# Patient Record
Sex: MALE | Race: BLACK OR AFRICAN AMERICAN | URBAN - METROPOLITAN AREA
[De-identification: names, ages, dates, MRNs, and addresses within clinical notes are randomized per-mention and may not be internally consistent; named-entity substitution may affect disease eponyms.]

---

## 2023-06-08 LAB
ALANINE AMINOTRANSFERASE (SGPT) (U/L) IN SER/PLAS: 11 U/L (ref 10–52)
ALBUMIN (G/DL) IN SER/PLAS: 4.3 G/DL (ref 3.4–5)
ALKALINE PHOSPHATASE (U/L) IN SER/PLAS: 38 U/L (ref 33–136)
ANION GAP IN SER/PLAS: 11 MMOL/L (ref 10–20)
ASPARTATE AMINOTRANSFERASE (SGOT) (U/L) IN SER/PLAS: 20 U/L (ref 9–39)
BASOPHILS (10*3/UL) IN BLOOD BY AUTOMATED COUNT: 0.02 X10E9/L (ref 0–0.1)
BASOPHILS/100 LEUKOCYTES IN BLOOD BY AUTOMATED COUNT: 0.5 % (ref 0–2)
BILIRUBIN TOTAL (MG/DL) IN SER/PLAS: 0.4 MG/DL (ref 0–1.2)
CALCIUM (MG/DL) IN SER/PLAS: 10 MG/DL (ref 8.6–10.6)
CARBON DIOXIDE, TOTAL (MMOL/L) IN SER/PLAS: 27 MMOL/L (ref 21–32)
CD3+CD4+ ABSOLUTE: 0.27 X10E9/L (ref 0.35–2.74)
CD3+CD8+ ABSOLUTE: 0.83 X10E9/L (ref 0.08–1.49)
CD4/CD8 RATIO: 0.32 (ref 1–3.5)
CD45%: 100 %
CHLORIDE (MMOL/L) IN SER/PLAS: 107 MMOL/L (ref 98–107)
CP CD3+CD4+%: 21 % (ref 29–57)
CP CD3+CD8+%: 65 % (ref 7–31)
CREATININE (MG/DL) IN SER/PLAS: 2.21 MG/DL (ref 0.5–1.3)
EOSINOPHILS (10*3/UL) IN BLOOD BY AUTOMATED COUNT: 0.13 X10E9/L (ref 0–0.4)
EOSINOPHILS/100 LEUKOCYTES IN BLOOD BY AUTOMATED COUNT: 3.3 % (ref 0–6)
ERYTHROCYTE DISTRIBUTION WIDTH (RATIO) BY AUTOMATED COUNT: 14.6 % (ref 11.5–14.5)
ERYTHROCYTE MEAN CORPUSCULAR HEMOGLOBIN CONCENTRATION (G/DL) BY AUTOMATED: 32.2 G/DL (ref 32–36)
ERYTHROCYTE MEAN CORPUSCULAR VOLUME (FL) BY AUTOMATED COUNT: 98 FL (ref 80–100)
ERYTHROCYTES (10*6/UL) IN BLOOD BY AUTOMATED COUNT: 3.95 X10E12/L (ref 4.5–5.9)
FMETH: ABNORMAL
FSIT1: ABNORMAL
GFR MALE: 31 ML/MIN/1.73M2
GLUCOSE (MG/DL) IN SER/PLAS: 75 MG/DL (ref 74–99)
HEMATOCRIT (%) IN BLOOD BY AUTOMATED COUNT: 38.8 % (ref 41–52)
HEMOGLOBIN (G/DL) IN BLOOD: 12.5 G/DL (ref 13.5–17.5)
IMMATURE GRANULOCYTES/100 LEUKOCYTES IN BLOOD BY AUTOMATED COUNT: 0 % (ref 0–0.9)
LEUKOCYTES (10*3/UL) IN BLOOD BY AUTOMATED COUNT: 4 X10E9/L (ref 4.4–11.3)
LYMPHOCYTES (10*3/UL) IN BLOOD BY AUTOMATED COUNT: 1.27 X10E9/L (ref 0.8–3)
LYMPHOCYTES/100 LEUKOCYTES IN BLOOD BY AUTOMATED COUNT: 31.8 % (ref 13–44)
MONOCYTES (10*3/UL) IN BLOOD BY AUTOMATED COUNT: 0.36 X10E9/L (ref 0.05–0.8)
MONOCYTES/100 LEUKOCYTES IN BLOOD BY AUTOMATED COUNT: 9 % (ref 2–10)
NEUTROPHILS (10*3/UL) IN BLOOD BY AUTOMATED COUNT: 2.21 X10E9/L (ref 1.6–5.5)
NEUTROPHILS/100 LEUKOCYTES IN BLOOD BY AUTOMATED COUNT: 55.4 % (ref 40–80)
NRBC (PER 100 WBCS) BY AUTOMATED COUNT: 0 /100 WBC (ref 0–0)
PLATELETS (10*3/UL) IN BLOOD AUTOMATED COUNT: 195 X10E9/L (ref 150–450)
POTASSIUM (MMOL/L) IN SER/PLAS: 5.2 MMOL/L (ref 3.5–5.3)
PROTEIN TOTAL: 7.2 G/DL (ref 6.4–8.2)
SODIUM (MMOL/L) IN SER/PLAS: 140 MMOL/L (ref 136–145)
UREA NITROGEN (MG/DL) IN SER/PLAS: 24 MG/DL (ref 6–23)

## 2023-06-09 LAB
HIV-1 RNA PCR VIRAL LOAD LOG: NORMAL LOG10 CPY/ML
HIV-1 RNA VIRAL LOAD: NOT DETECTED COPIES/ML

## 2023-09-29 ENCOUNTER — LAB (OUTPATIENT)
Dept: LAB | Facility: LAB | Age: 73
End: 2023-09-29
Payer: MEDICARE

## 2023-09-29 LAB
ALBUMIN (G/DL) IN SER/PLAS: 4.5 G/DL (ref 3.4–5)
ANION GAP IN SER/PLAS: 15 MMOL/L (ref 10–20)
CALCIUM (MG/DL) IN SER/PLAS: 10.1 MG/DL (ref 8.6–10.6)
CARBON DIOXIDE, TOTAL (MMOL/L) IN SER/PLAS: 27 MMOL/L (ref 21–32)
CHLORIDE (MMOL/L) IN SER/PLAS: 107 MMOL/L (ref 98–107)
CHOLESTEROL (MG/DL) IN SER/PLAS: 170 MG/DL (ref 0–199)
CHOLESTEROL IN HDL (MG/DL) IN SER/PLAS: 88.2 MG/DL
CHOLESTEROL/HDL RATIO: 1.9
CREATININE (MG/DL) IN SER/PLAS: 2.09 MG/DL (ref 0.5–1.3)
ERYTHROCYTE DISTRIBUTION WIDTH (RATIO) BY AUTOMATED COUNT: 14.5 % (ref 11.5–14.5)
ERYTHROCYTE MEAN CORPUSCULAR HEMOGLOBIN CONCENTRATION (G/DL) BY AUTOMATED: 32.1 G/DL (ref 32–36)
ERYTHROCYTE MEAN CORPUSCULAR VOLUME (FL) BY AUTOMATED COUNT: 97 FL (ref 80–100)
ERYTHROCYTES (10*6/UL) IN BLOOD BY AUTOMATED COUNT: 4.33 X10E12/L (ref 4.5–5.9)
FERRITIN (UG/LL) IN SER/PLAS: 102 UG/L (ref 20–300)
GFR MALE: 33 ML/MIN/1.73M2
GLUCOSE (MG/DL) IN SER/PLAS: 76 MG/DL (ref 74–99)
HEMATOCRIT (%) IN BLOOD BY AUTOMATED COUNT: 42.1 % (ref 41–52)
HEMOGLOBIN (G/DL) IN BLOOD: 13.5 G/DL (ref 13.5–17.5)
IRON (UG/DL) IN SER/PLAS: 49 UG/DL (ref 35–150)
IRON BINDING CAPACITY (UG/DL) IN SER/PLAS: 340 UG/DL (ref 240–445)
IRON SATURATION (%) IN SER/PLAS: 14 % (ref 25–45)
LDL: 69 MG/DL (ref 0–99)
LEUKOCYTES (10*3/UL) IN BLOOD BY AUTOMATED COUNT: 4 X10E9/L (ref 4.4–11.3)
NATRIURETIC PEPTIDE B (PG/ML) IN SER/PLAS: 108 PG/ML (ref 0–99)
NRBC (PER 100 WBCS) BY AUTOMATED COUNT: 0 /100 WBC (ref 0–0)
PHOSPHATE (MG/DL) IN SER/PLAS: 4.1 MG/DL (ref 2.5–4.9)
PLATELETS (10*3/UL) IN BLOOD AUTOMATED COUNT: 197 X10E9/L (ref 150–450)
POTASSIUM (MMOL/L) IN SER/PLAS: 5.5 MMOL/L (ref 3.5–5.3)
SODIUM (MMOL/L) IN SER/PLAS: 143 MMOL/L (ref 136–145)
TRIGLYCERIDE (MG/DL) IN SER/PLAS: 65 MG/DL (ref 0–149)
UREA NITROGEN (MG/DL) IN SER/PLAS: 20 MG/DL (ref 6–23)
VLDL: 13 MG/DL (ref 0–40)

## 2023-11-10 DIAGNOSIS — E78.5 HYPERLIPIDEMIA, UNSPECIFIED HYPERLIPIDEMIA TYPE: Primary | ICD-10-CM

## 2023-11-10 RX ORDER — ATORVASTATIN CALCIUM 80 MG/1
80 TABLET, FILM COATED ORAL DAILY
Qty: 90 TABLET | Refills: 3 | Status: SHIPPED | OUTPATIENT
Start: 2023-11-10

## 2023-11-27 DIAGNOSIS — I25.5 ISCHEMIC CARDIOMYOPATHY: Primary | ICD-10-CM

## 2023-11-27 RX ORDER — DAPAGLIFLOZIN 10 MG/1
10 TABLET, FILM COATED ORAL DAILY
COMMUNITY
Start: 2023-10-21 | End: 2023-11-27 | Stop reason: SDUPTHER

## 2023-11-27 RX ORDER — DAPAGLIFLOZIN 10 MG/1
10 TABLET, FILM COATED ORAL DAILY
Qty: 30 TABLET | Refills: 11 | Status: SHIPPED | OUTPATIENT
Start: 2023-11-27 | End: 2024-02-23 | Stop reason: ALTCHOICE

## 2023-12-07 ENCOUNTER — OFFICE VISIT (OUTPATIENT)
Dept: IMMUNOLOGY | Facility: CLINIC | Age: 73
End: 2023-12-07
Payer: MEDICARE

## 2023-12-07 VITALS
HEIGHT: 69 IN | TEMPERATURE: 97.9 F | BODY MASS INDEX: 20.67 KG/M2 | OXYGEN SATURATION: 100 % | DIASTOLIC BLOOD PRESSURE: 91 MMHG | HEART RATE: 56 BPM | SYSTOLIC BLOOD PRESSURE: 173 MMHG

## 2023-12-07 DIAGNOSIS — B20 AIDS (MULTI): Primary | ICD-10-CM

## 2023-12-07 DIAGNOSIS — Z79.899 HIGH RISK MEDICATION USE: ICD-10-CM

## 2023-12-07 DIAGNOSIS — L30.9 ECZEMA, UNSPECIFIED TYPE: ICD-10-CM

## 2023-12-07 DIAGNOSIS — I10 PRIMARY HYPERTENSION: ICD-10-CM

## 2023-12-07 PROBLEM — E78.5 HYPERLIPIDEMIA: Status: ACTIVE | Noted: 2023-12-07

## 2023-12-07 PROBLEM — I25.10 ARTERIOSCLEROSIS OF CORONARY ARTERY: Status: ACTIVE | Noted: 2023-12-07

## 2023-12-07 PROBLEM — I25.5 ISCHEMIC DILATED CARDIOMYOPATHY (MULTI): Status: ACTIVE | Noted: 2023-12-07

## 2023-12-07 PROBLEM — I42.0 ISCHEMIC DILATED CARDIOMYOPATHY (MULTI): Status: ACTIVE | Noted: 2023-12-07

## 2023-12-07 PROBLEM — D12.6 TUBULAR ADENOMA OF COLON: Status: ACTIVE | Noted: 2023-12-07

## 2023-12-07 PROBLEM — E66.9 OBESITY: Status: ACTIVE | Noted: 2023-12-07

## 2023-12-07 LAB
BASOPHILS # BLD AUTO: 0.02 X10*3/UL (ref 0–0.1)
BASOPHILS NFR BLD AUTO: 0.5 %
EOSINOPHIL # BLD AUTO: 0.14 X10*3/UL (ref 0–0.4)
EOSINOPHIL NFR BLD AUTO: 3.6 %
ERYTHROCYTE [DISTWIDTH] IN BLOOD BY AUTOMATED COUNT: 14.6 % (ref 11.5–14.5)
HCT VFR BLD AUTO: 41.6 % (ref 41–52)
HGB BLD-MCNC: 13.4 G/DL (ref 13.5–17.5)
IMM GRANULOCYTES # BLD AUTO: 0.01 X10*3/UL (ref 0–0.5)
IMM GRANULOCYTES NFR BLD AUTO: 0.3 % (ref 0–0.9)
LYMPHOCYTES # BLD AUTO: 1.17 X10*3/UL (ref 0.8–3)
LYMPHOCYTES NFR BLD AUTO: 29.7 %
MCH RBC QN AUTO: 31.5 PG (ref 26–34)
MCHC RBC AUTO-ENTMCNC: 32.2 G/DL (ref 32–36)
MCV RBC AUTO: 98 FL (ref 80–100)
MONOCYTES # BLD AUTO: 0.31 X10*3/UL (ref 0.05–0.8)
MONOCYTES NFR BLD AUTO: 7.9 %
NEUTROPHILS # BLD AUTO: 2.29 X10*3/UL (ref 1.6–5.5)
NEUTROPHILS NFR BLD AUTO: 58 %
NRBC BLD-RTO: 0 /100 WBCS (ref 0–0)
PLATELET # BLD AUTO: 191 X10*3/UL (ref 150–450)
RBC # BLD AUTO: 4.26 X10*6/UL (ref 4.5–5.9)
WBC # BLD AUTO: 3.9 X10*3/UL (ref 4.4–11.3)

## 2023-12-07 PROCEDURE — 82248 BILIRUBIN DIRECT: CPT | Performed by: INTERNAL MEDICINE

## 2023-12-07 PROCEDURE — 1126F AMNT PAIN NOTED NONE PRSNT: CPT | Performed by: INTERNAL MEDICINE

## 2023-12-07 PROCEDURE — 85025 COMPLETE CBC W/AUTO DIFF WBC: CPT | Performed by: INTERNAL MEDICINE

## 2023-12-07 PROCEDURE — 3077F SYST BP >= 140 MM HG: CPT | Performed by: INTERNAL MEDICINE

## 2023-12-07 PROCEDURE — 84100 ASSAY OF PHOSPHORUS: CPT | Performed by: INTERNAL MEDICINE

## 2023-12-07 PROCEDURE — 3080F DIAST BP >= 90 MM HG: CPT | Performed by: INTERNAL MEDICINE

## 2023-12-07 PROCEDURE — 1159F MED LIST DOCD IN RCRD: CPT | Performed by: INTERNAL MEDICINE

## 2023-12-07 PROCEDURE — 87536 HIV-1 QUANT&REVRSE TRNSCRPJ: CPT | Performed by: INTERNAL MEDICINE

## 2023-12-07 PROCEDURE — 1036F TOBACCO NON-USER: CPT | Performed by: INTERNAL MEDICINE

## 2023-12-07 PROCEDURE — 88185 FLOWCYTOMETRY/TC ADD-ON: CPT | Mod: TC | Performed by: INTERNAL MEDICINE

## 2023-12-07 PROCEDURE — 4274F FLU IMMUNO ADMIND RCVD: CPT | Performed by: INTERNAL MEDICINE

## 2023-12-07 PROCEDURE — 99214 OFFICE O/P EST MOD 30 MIN: CPT | Mod: 25 | Performed by: INTERNAL MEDICINE

## 2023-12-07 PROCEDURE — 1160F RVW MEDS BY RX/DR IN RCRD: CPT | Performed by: INTERNAL MEDICINE

## 2023-12-07 PROCEDURE — 99214 OFFICE O/P EST MOD 30 MIN: CPT | Performed by: INTERNAL MEDICINE

## 2023-12-07 PROCEDURE — 80053 COMPREHEN METABOLIC PANEL: CPT | Performed by: INTERNAL MEDICINE

## 2023-12-07 PROCEDURE — 36415 COLL VENOUS BLD VENIPUNCTURE: CPT | Performed by: INTERNAL MEDICINE

## 2023-12-07 RX ORDER — EFAVIRENZ, LAMIVUDINE AND TENOFOVIR DISOPROXIL FUMARATE 600; 300; 300 MG/1; MG/1; MG/1
1 TABLET, FILM COATED ORAL DAILY
Qty: 30 TABLET | Refills: 11 | Status: CANCELLED | OUTPATIENT
Start: 2023-12-07 | End: 2024-12-06

## 2023-12-07 RX ORDER — SACUBITRIL AND VALSARTAN 97; 103 MG/1; MG/1
1 TABLET, FILM COATED ORAL 2 TIMES DAILY
COMMUNITY
Start: 2023-01-09 | End: 2024-02-23

## 2023-12-07 RX ORDER — ABACAVIR AND LAMIVUDINE 600; 300 MG/1; MG/1
1 TABLET, FILM COATED ORAL DAILY
COMMUNITY
Start: 2012-04-09 | End: 2024-03-28

## 2023-12-07 RX ORDER — PNV NO.95/FERROUS FUM/FOLIC AC 28MG-0.8MG
1 TABLET ORAL DAILY
COMMUNITY
Start: 2018-11-19

## 2023-12-07 RX ORDER — DOLUTEGRAVIR SODIUM 50 MG/1
1 TABLET, FILM COATED ORAL DAILY
COMMUNITY
Start: 2017-04-24 | End: 2024-02-28 | Stop reason: SDUPTHER

## 2023-12-07 RX ORDER — TRAVOPROST OPHTHALMIC SOLUTION 0.04 MG/ML
1 SOLUTION OPHTHALMIC NIGHTLY
COMMUNITY

## 2023-12-07 RX ORDER — FOLIC ACID/MULTIVIT,IRON,MINER 0.4MG-18MG
2 TABLET ORAL DAILY
COMMUNITY
Start: 2022-09-02

## 2023-12-07 RX ORDER — SPIRONOLACTONE 25 MG/1
12.5 TABLET ORAL DAILY
COMMUNITY

## 2023-12-07 RX ORDER — EFAVIRENZ, LAMIVUDINE AND TENOFOVIR DISOPROXIL FUMARATE 600; 300; 300 MG/1; MG/1; MG/1
1 TABLET, FILM COATED ORAL DAILY
Qty: 30 TABLET | Refills: 11 | Status: SHIPPED | OUTPATIENT
Start: 2023-12-07 | End: 2023-12-07 | Stop reason: ALTCHOICE

## 2023-12-07 RX ORDER — ASPIRIN 325 MG
1 TABLET ORAL DAILY
COMMUNITY
Start: 2022-06-03

## 2023-12-07 RX ORDER — ASPIRIN 81 MG/1
1 TABLET ORAL DAILY
COMMUNITY
Start: 2015-10-15

## 2023-12-07 RX ORDER — BRIMONIDINE TARTRATE 2 MG/ML
1 SOLUTION/ DROPS OPHTHALMIC 2 TIMES DAILY
COMMUNITY
Start: 2023-10-10

## 2023-12-07 RX ORDER — HYDROCORTISONE ACETATE 0.5 %
2 CREAM (GRAM) TOPICAL
COMMUNITY
Start: 2022-06-03

## 2023-12-07 RX ORDER — CARVEDILOL 12.5 MG/1
12.5 TABLET ORAL 2 TIMES DAILY
COMMUNITY

## 2023-12-07 RX ORDER — BEMPEDOIC ACID AND EZETIMIBE 180; 10 MG/1; MG/1
1 TABLET, FILM COATED ORAL DAILY
COMMUNITY
Start: 2020-08-28 | End: 2024-03-28 | Stop reason: SDUPTHER

## 2023-12-07 NOTE — ASSESSMENT & PLAN NOTE
Persistent HTN after repeat testing with patient seated quietly for 10 minutes.  Endorses that morning BP 4 hours after medications is in the 80-90/70s.  Encouraged him to continue log and to take that to his next appointment with Dr. Garcia. They can check BP using both machines to verify that they are measuring equally.

## 2023-12-07 NOTE — PATIENT INSTRUCTIONS
It was great to see you today!  Your last blood work that we did in clinic showed that your T-cells (CD4 count) was 267 / 21 %.  Your virus was fully suppressed at less than 20 copies.  Keep up the great work taking your medications.  We collected routine blood work today.  I will see you back in clinic in 6 months.  If any issues should arise before then, please remember to call the clinic and get in contact with your nurse.    Don't forget to take your BP log and your BP machine with you to your appointment with Dr. Garcia when you go to see her so that she can make sure that your cuff is measuring accurately.    Please get the RSV vaccine when you are able.    Wishing you and your loved ones a very Happy Holiday Season!!  Be safe and see you next year!!

## 2023-12-07 NOTE — ASSESSMENT & PLAN NOTE
Remains immunologically intact and virologically suppressed.  Routine labs will be collected today: CBC w/ diff, Hepatic panel, renal panel, CD4 and HIV RNA PCR.   We will continue current therapy with Dolutegravir and Epzicom.    He will follow-up in 6months

## 2023-12-07 NOTE — PROGRESS NOTES
"HIV Clinic Follow-up Visit:    Ephraim Michel was last seen in [facility] on Visit date not found    Missed antiretroviral doses in last 72 hours? No    Sexually active? no, Partner/s aware of diagnosis? N/A,     Condom use?  NA , Partner on PrEP? NA    Tobacco use: No     SUBJECTIVE:   Here for follow up.  No ER or UC visits.  States that this AM (4 hrs after BP medications), his BP was 89/61 without symptoms of low perfusion.  Indicates that he is high at all medical visits and that Dr. Garcia is concerned.  He is taking machine in with his log when he sees her next so that readings can be compared and device calibrated if needed.  He denies HA, CP, ALVARENGA, PND, orthopnea.    He does not exercise at all.  Able to perform all ADLs without issue.  Walks to [place] and [place] without issues and carries groceries.  Climbs stairs to [place] without SOB, ALVARENGA, CP, or dizziness  Weight is stable at home in 140s.  Takes Metamucil TID for BMs and \"to keep weight under control\".  No straining with BMs.  Just received his Flu and Covid-19 booster on 12/1.  Asking about RSV vaccine.    Review of Systems  Full 10 point ROS performed.  All pertinent positives and negatives as documented in HPI.      CURRENT MEDICATIONS:    Current Outpatient Medications:     abacavir-lamiVUDine (Epzicom) 600-300 mg tablet, Take 1 tablet by mouth once daily., Disp: , Rfl:     aspirin 81 mg EC tablet, Take 1 tablet (81 mg) by mouth once daily., Disp: , Rfl:     atorvastatin (Lipitor) 80 mg tablet, TAKE 1 TABLET BY MOUTH EVERY DAY, Disp: 90 tablet, Rfl: 3    brimonidine (AlphaGAN) 0.2 % ophthalmic solution, Administer 1 drop into both eyes 2 times a day., Disp: , Rfl:     carvedilol (Coreg) 12.5 mg tablet, Take 1 tablet (12.5 mg) by mouth 2 times a day., Disp: , Rfl:     cholecalciferol (Vitamin D-3) 10 mcg (400 unit) tablet,chewable, Chew 2 tablets (800 Units) once daily., Disp: , Rfl:     cyanocobalamin (Vitamin B-12) 100 mcg tablet, Take 1 tablet (100 mcg) " "by mouth once daily., Disp: , Rfl:     Entresto  mg tablet, Take 1 tablet by mouth 2 times a day., Disp: , Rfl:     Farxiga 10 mg, Take 1 tablet (10 mg) by mouth once daily., Disp: 30 tablet, Rfl: 11    glucosam garber dip-chondroit-C-Mn 951-250-14-3 mg capsule, Take 2 capsules by mouth once daily., Disp: , Rfl:     multivitamin (One Daily Multivitamin) tablet, Take 1 tablet by mouth once daily., Disp: , Rfl:     Nexlizet 180-10 mg tablet, Take 1 tablet by mouth once daily., Disp: , Rfl:     spironolactone (Aldactone) 25 mg tablet, Take 0.5 tablets (12.5 mg) by mouth once daily., Disp: , Rfl:     Tivicay 50 mg tablet, Take 1 tablet (50 mg) by mouth once daily., Disp: , Rfl:     travoprost (Travatan Z) 0.004 % drops ophthalmic solution, Administer 1 drop into both eyes once daily at bedtime., Disp: , Rfl:     ferric carboxymaltose (Injectafer) 50 mg iron/mL injection, Administer intravenously once a week for 2 weeks (Patient not taking: Reported on 12/7/2023), Disp: 30 mL, Rfl: 0    PHYSICAL EXAMINATION:  Visit Vitals  BP (!) 173/91 (BP Location: Left arm, Patient Position: Sitting, BP Cuff Size: Adult)   Pulse 56   Temp 36.6 °C (97.9 °F)   Ht 1.753 m (5' 9\")   SpO2 100%   BMI 20.67 kg/m²   Smoking Status Former   BSA 1.76 m²       Physical Exam   CONSTITUTIONAL: Cachectic appearing , NAD, cooperative  SKIN:  No rashes or lesions.  EYES: PERRLA, EOMI, Sclera anicteric.  No conjunctival injection.  No petechial  or embolic lesions  ENMT: MMM, No oral lesions or thrush. Dentition in good repair.  No pharyngeal erythema  CVS: Old median sternotomy incision (healed) RRR, S1 & S2 normal.  No murmurs, rubs or gallops.    RESPIRATORY/CHEST: Clear in all lung fields.  No rales or rhonchi  GI: Soft, NT/ND.  No palpable hepatosplenomegaly.  No rebound or guarding  EXT: No CCE.  Neuro: No facial asymmetry. DTRs symmetric. Gait normal. No functional weakness      PERTINENT DATA:  CBC:  WBC   Date Value Ref Range Status "   09/29/2023 4.0 (L) 4.4 - 11.3 x10E9/L Final     nRBC   Date Value Ref Range Status   09/29/2023 0.0 0.0 - 0.0 /100 WBC Final     RBC   Date Value Ref Range Status   09/29/2023 4.33 (L) 4.50 - 5.90 x10E12/L Final     Hemoglobin   Date Value Ref Range Status   09/29/2023 13.5 13.5 - 17.5 g/dL Final     MCV   Date Value Ref Range Status   09/29/2023 97 80 - 100 fL Final     RDW   Date Value Ref Range Status   09/29/2023 14.5 11.5 - 14.5 % Final       Renal Function Panel:  Glucose   Date Value Ref Range Status   09/29/2023 76 74 - 99 mg/dL Final     Sodium   Date Value Ref Range Status   09/29/2023 143 136 - 145 mmol/L Final     Potassium   Date Value Ref Range Status   09/29/2023 5.5 (H) 3.5 - 5.3 mmol/L Final     Chloride   Date Value Ref Range Status   09/29/2023 107 98 - 107 mmol/L Final     Anion Gap   Date Value Ref Range Status   09/29/2023 15 10 - 20 mmol/L Final     Urea Nitrogen   Date Value Ref Range Status   09/29/2023 20 6 - 23 mg/dL Final     Creatinine   Date Value Ref Range Status   09/29/2023 2.09 (H) 0.50 - 1.30 mg/dL Final     Calcium   Date Value Ref Range Status   09/29/2023 10.1 8.6 - 10.6 mg/dL Final     Phosphorus   Date Value Ref Range Status   09/29/2023 4.1 2.5 - 4.9 mg/dL Final     Comment:      The performance characteristics of phosphorus testing in   heparinized plasma have been validated by the individual     laboratory site where testing is performed. Testing    on heparinized plasma is not approved by the FDA;    however, such approval is not necessary.       Albumin   Date Value Ref Range Status   09/29/2023 4.5 3.4 - 5.0 g/dL Final       Hepatic Panel:  Albumin   Date Value Ref Range Status   09/29/2023 4.5 3.4 - 5.0 g/dL Final     Total Bilirubin   Date Value Ref Range Status   06/08/2023 0.4 0.0 - 1.2 mg/dL Final     Bilirubin, Direct   Date Value Ref Range Status   12/08/2022 0.1 0.0 - 0.3 mg/dL Final     Alkaline Phosphatase   Date Value Ref Range Status   06/08/2023 38 33 -  "136 U/L Final     ALT (SGPT)   Date Value Ref Range Status   06/08/2023 11 10 - 52 U/L Final     Comment:      Patients treated with Sulfasalazine may generate    falsely decreased results for ALT.         HIV Viral Load:  No results found for: \"HUE8AZFTUE\", \"HIVRNAPCR\"    CD4 Count:  CD3+CD4+%   Date Value Ref Range Status   06/08/2023 21 (L) 29 - 57 % Final     CD3+CD4+ Absolute   Date Value Ref Range Status   06/08/2023 0.267 (L) 0.350 - 2.740 x10E9/L Final     CD3+CD8+%   Date Value Ref Range Status   06/08/2023 65 (H) 7 - 31 % Final     CD3+CD8+ Absolute   Date Value Ref Range Status   06/08/2023 0.826 0.080 - 1.490 x10E9/L Final     CD4/CD8 Ratio   Date Value Ref Range Status   06/08/2023 0.32 (L) 1.00 - 3.50 Final     CD45%   Date Value Ref Range Status   06/08/2023 100 % Final       CRCL:  Creatinine, Urine   Date Value Ref Range Status   08/25/2020 66.7 20.0 - 370.0 mg/dL Final       Lipid Panel:  HDL   Date Value Ref Range Status   09/29/2023 88.2 mg/dL Final     Comment:     .      AGE      VERY LOW   LOW     NORMAL    HIGH       0-19 Y       < 35   < 40     40-45     ----    20-24 Y       ----   < 40       >45     ----      >24 Y       ----   < 40     40-60      >60  .       Cholesterol/HDL Ratio   Date Value Ref Range Status   09/29/2023 1.9  Final     Comment:     REF VALUES  DESIRABLE  < 3.4  HIGH RISK  > 5.0       LDL   Date Value Ref Range Status   09/29/2023 69 0 - 99 mg/dL Final     Comment:     .                           NEAR      BORD      AGE      DESIRABLE  OPTIMAL    HIGH     HIGH     VERY HIGH     0-19 Y     0 - 109     ---    110-129   >/= 130     ----    20-24 Y     0 - 119     ---    120-159   >/= 160     ----      >24 Y     0 -  99   100-129  130-159   160-189     >/=190  .       VLDL   Date Value Ref Range Status   09/29/2023 13 0 - 40 mg/dL Final     Triglycerides   Date Value Ref Range Status   09/29/2023 65 0 - 149 mg/dL Final     Comment:     .      AGE      DESIRABLE   BORDERLINE " HIGH   HIGH     VERY HIGH   0 D-90 D    19 - 174         ----         ----        ----  91 D- 9 Y     0 -  74        75 -  99     >/= 100      ----    10-19 Y     0 -  89        90 - 129     >/= 130      ----    20-24 Y     0 - 114       115 - 149     >/= 150      ----         >24 Y     0 - 149       150 - 199    200- 499    >/= 500  .   Venipuncture immediately after or during the    administration of Metamizole may lead to falsely   low results. Testing should be performed immediately   prior to Metamizole dosing.         HgbA1c:      The ASCVD Risk score (William OROPEZA, et al., 2019) failed to calculate for the following reasons:    Unable to determine if patient is Non-     ASSESSMENT / PLAN:    Problem List Items Addressed This Visit       AIDS (CMS/HCC) - Primary (Chronic)    Overview     Initially diagnosed on 2/8/2022.  Received CBV+EFV from 3/14/02 -3/4/04.  Transitioned to CBV +ANAM/r  on 3/4/04- 10/30/06  Switched to Truvada thus TVD+ANAM/r from 10/30/06 - 4/9/2012.  Switched TVD to  Epzicom (EPZ) on 4/9/2012. Regimen was EPZ+ANAM/r from 4/9/12 - 4/24/17.  Switched to EPZ + Dolutegravir on 4/24/17 to remove boosting agent of RTV to allow treatment of elevated cholesterol with newer agents.         Current Assessment & Plan     Remains immunologically intact and virologically suppressed.  Routine labs will be collected today: CBC w/ diff, Hepatic panel, renal panel, CD4 and HIV RNA PCR.   We will continue current therapy with Dolutegravir and Epzicom.    He will follow-up in 6months         Relevant Orders    CBC and Auto Differential    HIV RNA, quantitative, PCR    CD4/8 Panel    Eczema    Hypertension    Current Assessment & Plan     Persistent HTN after repeat testing with patient seated quietly for 10 minutes.  Endorses that morning BP 4 hours after medications is in the 80-90/70s.  Encouraged him to continue log and to take that to his next appointment with Dr. Garcia. They can  check BP using both machines to verify that they are measuring equally.         High risk medication use    Relevant Orders    Hepatic Function Panel    Basic Metabolic Panel    Phosphorus       Nadia Molina MD

## 2023-12-08 ENCOUNTER — CLINICAL SUPPORT (OUTPATIENT)
Dept: IMMUNOLOGY | Facility: CLINIC | Age: 73
End: 2023-12-08
Payer: MEDICARE

## 2023-12-08 DIAGNOSIS — E87.5 HYPERKALEMIA: ICD-10-CM

## 2023-12-08 LAB
ALBUMIN SERPL BCP-MCNC: 4.5 G/DL (ref 3.4–5)
ALP SERPL-CCNC: 33 U/L (ref 33–136)
ALT SERPL W P-5'-P-CCNC: 13 U/L (ref 10–52)
ANION GAP SERPL CALC-SCNC: 15 MMOL/L (ref 10–20)
ANION GAP SERPL CALC-SCNC: 16 MMOL/L (ref 10–20)
AST SERPL W P-5'-P-CCNC: 21 U/L (ref 9–39)
BILIRUB DIRECT SERPL-MCNC: 0.1 MG/DL (ref 0–0.3)
BILIRUB SERPL-MCNC: 0.5 MG/DL (ref 0–1.2)
BUN SERPL-MCNC: 25 MG/DL (ref 6–23)
BUN SERPL-MCNC: 26 MG/DL (ref 6–23)
CALCIUM SERPL-MCNC: 10.2 MG/DL (ref 8.6–10.6)
CALCIUM SERPL-MCNC: 9.7 MG/DL (ref 8.6–10.6)
CD3+CD4+ CELLS # BLD: 0.21 X10E9/L
CD3+CD4+ CELLS # BLD: 211 /MM3
CD3+CD4+ CELLS NFR BLD: 18 %
CD3+CD4+ CELLS/CD3+CD8+ CLL BLD: 0.27 %
CD3+CD8+ CELLS # BLD: 0.78 X10E9/L
CD3+CD8+ CELLS NFR BLD: 67 %
CHLORIDE SERPL-SCNC: 103 MMOL/L (ref 98–107)
CHLORIDE SERPL-SCNC: 105 MMOL/L (ref 98–107)
CO2 SERPL-SCNC: 26 MMOL/L (ref 21–32)
CO2 SERPL-SCNC: 27 MMOL/L (ref 21–32)
CREAT SERPL-MCNC: 1.97 MG/DL (ref 0.5–1.3)
CREAT SERPL-MCNC: 2.02 MG/DL (ref 0.5–1.3)
GFR SERPL CREATININE-BSD FRML MDRD: 35 ML/MIN/1.73M*2
GFR SERPL CREATININE-BSD FRML MDRD: 36 ML/MIN/1.73M*2
GLUCOSE SERPL-MCNC: 51 MG/DL (ref 74–99)
GLUCOSE SERPL-MCNC: 60 MG/DL (ref 74–99)
HIV1 RNA # PLAS NAA DL=20: 23 COPIES/ML
HIV1 RNA # PLAS NAA DL=20: DETECTED {COPIES}/ML
HIV1 RNA SPEC NAA+PROBE-LOG#: 1.36 LOG10 CPY/ML
LYMPHOCYTES # SPEC AUTO: 1.17 X10*3/UL
PHOSPHATE SERPL-MCNC: 4 MG/DL (ref 2.5–4.9)
POTASSIUM SERPL-SCNC: 4.7 MMOL/L (ref 3.5–5.3)
POTASSIUM SERPL-SCNC: 6.2 MMOL/L (ref 3.5–5.3)
PROT SERPL-MCNC: 7.4 G/DL (ref 6.4–8.2)
SODIUM SERPL-SCNC: 139 MMOL/L (ref 136–145)
SODIUM SERPL-SCNC: 142 MMOL/L (ref 136–145)

## 2023-12-08 PROCEDURE — 36415 COLL VENOUS BLD VENIPUNCTURE: CPT

## 2023-12-08 PROCEDURE — 82374 ASSAY BLOOD CARBON DIOXIDE: CPT | Performed by: INTERNAL MEDICINE

## 2023-12-08 NOTE — PROGRESS NOTES
K from yesterday is 6.2. Spoke to pt this am and he will come in this afternoon for a repeat. Will review with pt if he is consuming anything high in potassium.

## 2024-02-23 DIAGNOSIS — I25.5 ISCHEMIC DILATED CARDIOMYOPATHY (MULTI): Primary | ICD-10-CM

## 2024-02-23 DIAGNOSIS — I42.0 ISCHEMIC DILATED CARDIOMYOPATHY (MULTI): Primary | ICD-10-CM

## 2024-02-23 RX ORDER — DAPAGLIFLOZIN 10 MG/1
10 TABLET, FILM COATED ORAL DAILY
Qty: 90 TABLET | Refills: 3 | Status: SHIPPED | OUTPATIENT
Start: 2024-02-23

## 2024-02-23 RX ORDER — LOSARTAN POTASSIUM 100 MG/1
100 TABLET ORAL DAILY
Qty: 30 TABLET | Refills: 11 | Status: SHIPPED | OUTPATIENT
Start: 2024-02-23 | End: 2025-02-22

## 2024-02-28 DIAGNOSIS — B20 HIV INFECTION, UNSPECIFIED SYMPTOM STATUS (MULTI): ICD-10-CM

## 2024-02-29 RX ORDER — DOLUTEGRAVIR SODIUM 50 MG/1
50 TABLET, FILM COATED ORAL DAILY
Qty: 30 TABLET | Refills: 5 | Status: SHIPPED | OUTPATIENT
Start: 2024-02-29 | End: 2025-02-28

## 2024-03-28 DIAGNOSIS — B20 HUMAN IMMUNODEFICIENCY VIRUS (HIV) DISEASE (MULTI): ICD-10-CM

## 2024-03-28 DIAGNOSIS — I25.10 ARTERIOSCLEROSIS OF CORONARY ARTERY: Primary | ICD-10-CM

## 2024-03-28 RX ORDER — ABACAVIR AND LAMIVUDINE 600; 300 MG/1; MG/1
1 TABLET, FILM COATED ORAL DAILY
Qty: 30 TABLET | Refills: 5 | Status: SHIPPED | OUTPATIENT
Start: 2024-03-28

## 2024-03-28 RX ORDER — BEMPEDOIC ACID AND EZETIMIBE 180; 10 MG/1; MG/1
1 TABLET, FILM COATED ORAL DAILY
Qty: 30 TABLET | Refills: 7 | Status: SHIPPED | OUTPATIENT
Start: 2024-03-28

## 2024-03-29 ENCOUNTER — APPOINTMENT (OUTPATIENT)
Dept: CARDIOLOGY | Facility: HOSPITAL | Age: 74
End: 2024-03-29
Payer: COMMERCIAL

## 2024-04-12 ENCOUNTER — APPOINTMENT (OUTPATIENT)
Dept: CARDIOLOGY | Facility: HOSPITAL | Age: 74
End: 2024-04-12
Payer: MEDICARE

## 2024-05-16 ENCOUNTER — LAB (OUTPATIENT)
Dept: LAB | Facility: LAB | Age: 74
End: 2024-05-16
Payer: MEDICARE

## 2024-05-16 ENCOUNTER — OFFICE VISIT (OUTPATIENT)
Dept: CARDIOLOGY | Facility: HOSPITAL | Age: 74
End: 2024-05-16
Payer: MEDICARE

## 2024-05-16 VITALS
SYSTOLIC BLOOD PRESSURE: 152 MMHG | OXYGEN SATURATION: 99 % | HEIGHT: 70 IN | BODY MASS INDEX: 19.24 KG/M2 | HEART RATE: 66 BPM | WEIGHT: 134.4 LBS | DIASTOLIC BLOOD PRESSURE: 89 MMHG

## 2024-05-16 DIAGNOSIS — I42.0 ISCHEMIC DILATED CARDIOMYOPATHY (MULTI): ICD-10-CM

## 2024-05-16 DIAGNOSIS — I42.0 ISCHEMIC DILATED CARDIOMYOPATHY (MULTI): Primary | ICD-10-CM

## 2024-05-16 DIAGNOSIS — I25.5 ISCHEMIC DILATED CARDIOMYOPATHY (MULTI): Primary | ICD-10-CM

## 2024-05-16 DIAGNOSIS — I25.5 ISCHEMIC DILATED CARDIOMYOPATHY (MULTI): ICD-10-CM

## 2024-05-16 LAB
ALBUMIN SERPL BCP-MCNC: 4 G/DL (ref 3.4–5)
ANION GAP SERPL CALC-SCNC: 16 MMOL/L (ref 10–20)
BASOPHILS # BLD AUTO: 0.01 X10*3/UL (ref 0–0.1)
BASOPHILS NFR BLD AUTO: 0.2 %
BNP SERPL-MCNC: 470 PG/ML (ref 0–99)
BUN SERPL-MCNC: 27 MG/DL (ref 6–23)
CALCIUM SERPL-MCNC: 9.8 MG/DL (ref 8.6–10.6)
CHLORIDE SERPL-SCNC: 104 MMOL/L (ref 98–107)
CO2 SERPL-SCNC: 26 MMOL/L (ref 21–32)
CREAT SERPL-MCNC: 1.78 MG/DL (ref 0.5–1.3)
EGFRCR SERPLBLD CKD-EPI 2021: 40 ML/MIN/1.73M*2
EOSINOPHIL # BLD AUTO: 0.11 X10*3/UL (ref 0–0.4)
EOSINOPHIL NFR BLD AUTO: 1.8 %
ERYTHROCYTE [DISTWIDTH] IN BLOOD BY AUTOMATED COUNT: 14.7 % (ref 11.5–14.5)
FERRITIN SERPL-MCNC: 238 NG/ML (ref 20–300)
GLUCOSE SERPL-MCNC: 88 MG/DL (ref 74–99)
HCT VFR BLD AUTO: 40.1 % (ref 41–52)
HGB BLD-MCNC: 12.6 G/DL (ref 13.5–17.5)
IMM GRANULOCYTES # BLD AUTO: 0.01 X10*3/UL (ref 0–0.5)
IMM GRANULOCYTES NFR BLD AUTO: 0.2 % (ref 0–0.9)
IRON SATN MFR SERPL: 20 % (ref 25–45)
IRON SERPL-MCNC: 67 UG/DL (ref 35–150)
LYMPHOCYTES # BLD AUTO: 1.48 X10*3/UL (ref 0.8–3)
LYMPHOCYTES NFR BLD AUTO: 24.5 %
MCH RBC QN AUTO: 30.7 PG (ref 26–34)
MCHC RBC AUTO-ENTMCNC: 31.4 G/DL (ref 32–36)
MCV RBC AUTO: 98 FL (ref 80–100)
MONOCYTES # BLD AUTO: 0.54 X10*3/UL (ref 0.05–0.8)
MONOCYTES NFR BLD AUTO: 8.9 %
NEUTROPHILS # BLD AUTO: 3.9 X10*3/UL (ref 1.6–5.5)
NEUTROPHILS NFR BLD AUTO: 64.4 %
NRBC BLD-RTO: 0 /100 WBCS (ref 0–0)
PHOSPHATE SERPL-MCNC: 4.6 MG/DL (ref 2.5–4.9)
PLATELET # BLD AUTO: 228 X10*3/UL (ref 150–450)
POTASSIUM SERPL-SCNC: 5.5 MMOL/L (ref 3.5–5.3)
RBC # BLD AUTO: 4.11 X10*6/UL (ref 4.5–5.9)
SODIUM SERPL-SCNC: 140 MMOL/L (ref 136–145)
TIBC SERPL-MCNC: 330 UG/DL (ref 240–445)
UIBC SERPL-MCNC: 263 UG/DL (ref 110–370)
WBC # BLD AUTO: 6.1 X10*3/UL (ref 4.4–11.3)

## 2024-05-16 PROCEDURE — 85025 COMPLETE CBC W/AUTO DIFF WBC: CPT

## 2024-05-16 PROCEDURE — 1159F MED LIST DOCD IN RCRD: CPT | Performed by: STUDENT IN AN ORGANIZED HEALTH CARE EDUCATION/TRAINING PROGRAM

## 2024-05-16 PROCEDURE — 3077F SYST BP >= 140 MM HG: CPT | Performed by: STUDENT IN AN ORGANIZED HEALTH CARE EDUCATION/TRAINING PROGRAM

## 2024-05-16 PROCEDURE — 36415 COLL VENOUS BLD VENIPUNCTURE: CPT

## 2024-05-16 PROCEDURE — 83540 ASSAY OF IRON: CPT

## 2024-05-16 PROCEDURE — 93005 ELECTROCARDIOGRAM TRACING: CPT | Performed by: STUDENT IN AN ORGANIZED HEALTH CARE EDUCATION/TRAINING PROGRAM

## 2024-05-16 PROCEDURE — 1126F AMNT PAIN NOTED NONE PRSNT: CPT | Performed by: STUDENT IN AN ORGANIZED HEALTH CARE EDUCATION/TRAINING PROGRAM

## 2024-05-16 PROCEDURE — 83550 IRON BINDING TEST: CPT

## 2024-05-16 PROCEDURE — 82728 ASSAY OF FERRITIN: CPT

## 2024-05-16 PROCEDURE — 93010 ELECTROCARDIOGRAM REPORT: CPT | Performed by: INTERNAL MEDICINE

## 2024-05-16 PROCEDURE — 3079F DIAST BP 80-89 MM HG: CPT | Performed by: STUDENT IN AN ORGANIZED HEALTH CARE EDUCATION/TRAINING PROGRAM

## 2024-05-16 PROCEDURE — 99215 OFFICE O/P EST HI 40 MIN: CPT | Performed by: STUDENT IN AN ORGANIZED HEALTH CARE EDUCATION/TRAINING PROGRAM

## 2024-05-16 PROCEDURE — 80069 RENAL FUNCTION PANEL: CPT

## 2024-05-16 PROCEDURE — 83880 ASSAY OF NATRIURETIC PEPTIDE: CPT

## 2024-05-16 PROCEDURE — 1036F TOBACCO NON-USER: CPT | Performed by: STUDENT IN AN ORGANIZED HEALTH CARE EDUCATION/TRAINING PROGRAM

## 2024-05-16 RX ORDER — SACUBITRIL AND VALSARTAN 49; 51 MG/1; MG/1
1 TABLET, FILM COATED ORAL 2 TIMES DAILY
COMMUNITY

## 2024-05-16 RX ORDER — WEIGH SCALE
1 MISCELLANEOUS MISCELLANEOUS DAILY
Qty: 1 EACH | Refills: 0 | Status: SHIPPED | OUTPATIENT
Start: 2024-05-16

## 2024-05-16 ASSESSMENT — ENCOUNTER SYMPTOMS
WEIGHT GAIN: 0
NAUSEA: 0
NERVOUS/ANXIOUS: 0
DOUBLE VISION: 0
DECREASED APPETITE: 0
FEVER: 0
BACK PAIN: 0
PALPITATIONS: 0
BLURRED VISION: 0
HEADACHES: 0
PND: 0
DIZZINESS: 0
WEIGHT LOSS: 0
DYSURIA: 0
SORE THROAT: 0
CHANGE IN BOWEL HABIT: 0
DYSPNEA ON EXERTION: 0
ORTHOPNEA: 0
VOMITING: 0
LIGHT-HEADEDNESS: 0
SHORTNESS OF BREATH: 0
COLOR CHANGE: 0
COUGH: 0
ABDOMINAL PAIN: 0
DEPRESSION: 0
FALLS: 0

## 2024-05-16 ASSESSMENT — COLUMBIA-SUICIDE SEVERITY RATING SCALE - C-SSRS
1. IN THE PAST MONTH, HAVE YOU WISHED YOU WERE DEAD OR WISHED YOU COULD GO TO SLEEP AND NOT WAKE UP?: NO
2. HAVE YOU ACTUALLY HAD ANY THOUGHTS OF KILLING YOURSELF?: NO
6. HAVE YOU EVER DONE ANYTHING, STARTED TO DO ANYTHING, OR PREPARED TO DO ANYTHING TO END YOUR LIFE?: NO

## 2024-05-16 ASSESSMENT — PATIENT HEALTH QUESTIONNAIRE - PHQ9
2. FEELING DOWN, DEPRESSED OR HOPELESS: NOT AT ALL
1. LITTLE INTEREST OR PLEASURE IN DOING THINGS: NOT AT ALL
SUM OF ALL RESPONSES TO PHQ9 QUESTIONS 1 AND 2: 0

## 2024-05-16 ASSESSMENT — PAIN SCALES - GENERAL: PAINLEVEL: 0-NO PAIN

## 2024-05-16 NOTE — PATIENT INSTRUCTIONS
Thank you for coming in today. If you have any questions or concerns, you may call the Heart Failure Office at [phone] option [phone], or [phone].  You may also contact our heart failure nursing team via email on [email or web address].    For quicker results set-up your  [employer] account to receive results and other correspondence directly to your phone.    Please bring all your pills/medications to your Cardiology appointments.    **  - We will prescribe a blood pressure monitor for you today    - No new medication changes today    -We will renew your heart medications today    - Please have the following tests done:  1.Blood tests TODAY (RFP, BNP, CBC, iron, TIBC, ferritin)    2.  Echocardiogram in NOVEMBER 2024, CALL  Tel [phone]   to schedule this test    - Please make an appointment to be seen in 12/2024

## 2024-05-16 NOTE — PROGRESS NOTES
"Referring Clinician: Dr. Erik Sanchez  Former cardiologist: Dr. Erik Sanchez     Chief Complaint  Ambulatory heart failure care      History of Present Illness  74 y.o. retired [employer]  who presents for advanced heart failure care. He has a past medical history significant for CAD status post CABG 2003 with Dr Sanders (unknown anatomy) and documented past PCI to one of his vein grafts in 2004, ischemic cardiomyopathy, HFimprovedEF and on TTE 10/2021 LVEF 45-50% LVIDD 5.3 cm with normal right ventricular systolic function. On repeat TTE 6/2023 LVEF 50-55% LVIDd 4.1 cm with normal RV systolic function.  His other comorbidities include HIV maintained on HAART, chronic kidney disease, hyperlipidemia, hypertension.  He presents today to continue advanced heart failure care; tolerating optimised HF GDMT titration.  He has completed CPET 6/2023 during which he exercised for 16: 02 minutes with RER 1.2, VO2 max 19.3 (71%) and VE/VCO2 36.    He has brought his home blood pressure readings, these have ranged /61-73 mmHg.  Notably will be checked his blood pressure machine was cystoscopy in the office today, there was a discrepancy in the home blood pressure monitor readings (right higher).    Symptomatically he continues to feel well and there is no h/o chest pain, SOB at rest, SOBOE, PND, orthopnoea, bendopnoea, fatigue, leg swelling, palpitations, syncope, or pre syncope.    Functionally, his exercise capacity is described as unlimited and can climb 3 flights of stairs without exertional symptoms \" I can walk forever \"    He is adherent with heart failure diet, fluid restriction, and HF medications.     He never been admitted with HF.    Surgical Hx:  -CABG 2003    Social Hx:  - Smoking-briefly smoked in college, smoked [id number] for 1 PPD   - ETOH - \" social drinker\" , 1 drink a month at most   - Illicit drugs - never   - Lives alone     Family Hx:  Specifically, " there is no family history of CAD, heart failure, ICD, PPM, LVAD, OHT, arrhythmias, or sudden cardiac death.    maternal gfather-  with anaesthesia, CVA   paternal aunt- heart surgery ? diagnosis   Medication reconciliation completed, see below.     Investigations:    The electronic medical record has been reviewed by me for salient history. All cardiovascular imaging and testing available in the electronic medical record, and [employer] has been reviewed.      Medication Documentation Review Audit       Reviewed by Maru Garcia RN (Registered Nurse) on 24 at 1014      Medication Order Taking? Sig Documenting Provider Last Dose Status   abacavir-lamiVUDine (Epzicom) 600-300 mg tablet [id number] Yes TAKE 1 TABLET BY MOUTH EVERY DAY Nadia SOTO MD Taking Active   aspirin 81 mg EC tablet [id number] Yes Take 1 tablet (81 mg) by mouth once daily. Historical Provider, MD Taking Active   atorvastatin (Lipitor) 80 mg tablet [id number] Yes TAKE 1 TABLET BY MOUTH EVERY DAY Nadia SOTO MD Taking Active   brimonidine (AlphaGAN) 0.2 % ophthalmic solution [id number] Yes Administer 1 drop into both eyes 2 times a day. Historical Provider, MD Taking Active   carvedilol (Coreg) 12.5 mg tablet [id number] Yes Take 1 tablet (12.5 mg) by mouth 2 times a day. Historical Provider, MD Taking Active   cholecalciferol (Vitamin D-3) 10 mcg (400 unit) tablet,chewable [id number] Yes Chew 2 tablets (800 Units) once daily. Historical Provider, MD Taking Active   cyanocobalamin (Vitamin B-12) 100 mcg tablet [id number] Yes Take 1 tablet (100 mcg) by mouth once daily. Historical Provider, MD Taking Active   dapagliflozin propanediol (Farxiga) 10 mg [id number] Yes Take 1 tablet (10 mg) by mouth once daily. Bella Garcia MD PhD Taking Active   Entresto 49-51 mg tablet [id number] Yes Take 1 tablet by mouth 2 times a day. Historical Provider, MD Taking Active   ferric carboxymaltose (Injectafer) 50 mg iron/mL injection [id number]  No Administer intravenously once a week for 2 weeks   Patient not taking: Reported on 12/7/2023     Not Taking Active   glucosam garber dip-chondroit-C-Mn 500-173-10-3 mg capsule [id number] Yes Take 2 capsules by mouth once daily. Historical Provider, MD Taking Active   losartan (Cozaar) 100 mg tablet [id number] No Take 1 tablet (100 mg) by mouth once daily.   Patient not taking: Reported on 5/16/2024    Bella Garcia MD PhD Not Taking Flag for Review   multivitamin (One Daily Multivitamin) tablet [id number] Yes Take 1 tablet by mouth once daily. Historical Provider, MD Taking Active   Nexlizet 180-10 mg tablet [id number] Yes Take 1 tablet by mouth once daily. Bella Garcia MD PhD Taking Active   spironolactone (Aldactone) 25 mg tablet [id number] Yes Take 0.5 tablets (12.5 mg) by mouth once daily. Historical Provider, MD Taking Active   Tivicay 50 mg tablet [id number] Yes Take 1 tablet (50 mg) by mouth once daily. Nadia SOTO MD Taking Active   travoprost (Travatan Z) 0.004 % drops ophthalmic solution [id number] Yes Administer 1 drop into both eyes once daily at bedtime. Historical Provider, MD Taking Active                 No Known Allergies    Review of Systems   Constitutional: Negative for decreased appetite, fever, malaise/fatigue, weight gain and weight loss.   HENT:  Negative for sore throat.    Eyes:  Negative for blurred vision and double vision.   Cardiovascular:  Negative for chest pain, dyspnea on exertion, leg swelling, orthopnea, palpitations and paroxysmal nocturnal dyspnea.   Respiratory:  Negative for cough and shortness of breath.    Skin:  Negative for color change, dry skin and itching.   Musculoskeletal:  Negative for back pain, falls and muscle weakness.   Gastrointestinal:  Negative for abdominal pain, change in bowel habit, nausea and vomiting.   Genitourinary:  Negative for dysuria.   Neurological:  Negative for dizziness, headaches and light-headedness.   Psychiatric/Behavioral:   "Negative for depression. The patient is not nervous/anxious.        Visit Vitals  BP (!) 170/94 (BP Location: Left arm, Patient Position: Sitting, BP Cuff Size: Adult)   Pulse 66   Ht 1.765 m (5' 9.5\")   Wt 61 kg (134 lb 6.4 oz)   SpO2 99%   BMI 19.56 kg/m²   Smoking Status Former   BSA 1.73 m²      Vitals:    05/16/24 1012 05/16/24 1030   BP: (!) 170/94 152/89   BP Location: Left arm    Patient Position: Sitting    BP Cuff Size: Adult    Pulse: 66    SpO2: 99%    Weight: 61 kg (134 lb 6.4 oz)    Height: 1.765 m (5' 9.5\")       On examination:    Very pleasant thin elderly man in no apparent CP or painful distress.   Immaculately groomed   Neck: No JVD or HJR  Chest wall: healed sternotomy scar , no wound dehiscence. stable sternum  CVS: HS 1,2. No added sounds  Resp: CTA bilaterally. Percussion note resonant   Abdomen: Flat, SNT, BS wnl  Extremities: No pedal oedema  Skin: warm and dry  CNS: AO x 4, no gross deficits  Lab Results   Component Value Date    WBC 6.1 05/16/2024    HGB 12.6 (L) 05/16/2024    HCT 40.1 (L) 05/16/2024    MCV 98 05/16/2024     05/16/2024       Chemistry    Lab Results   Component Value Date/Time     05/16/2024 1111    K 5.5 (H) 05/16/2024 1111     05/16/2024 1111    CO2 26 05/16/2024 1111    BUN 27 (H) 05/16/2024 1111    CREATININE 1.78 (H) 05/16/2024 1111    Lab Results   Component Value Date/Time    CALCIUM 9.8 05/16/2024 1111    ALKPHOS 33 12/07/2023 1424    AST 21 12/07/2023 1424    ALT 13 12/07/2023 1424    BILITOT 0.5 12/07/2023 1424        No echocardiogram results found for the past 12 months      IMPRESSION:    74 y.o. retired [employer]  who presents for advanced heart failure care. He has a past medical history significant for CAD status post CABG 2003 with Dr Sanders (unknown anatomy) and documented past PCI to one of his vein grafts in 2004, ischemic cardiomyopathy, HFimprovedEF and on TTE 10/2021 LVEF 45-50% LVIDD 5.3 cm with " normal right ventricular systolic function. On repeat TTE 6/2023 LVEF 50-55% LVIDd 4.1 cm with normal RV systolic function.  His other comorbidities include HIV maintained on HAART, chronic kidney disease, hyperlipidemia, hypertension.  He presents today to continue advanced heart failure care; tolerating optimised HF GDMT titration.  He has completed CPET 6/2023 during which he exercised for 16: 02 minutes with RER 1.2, VO2 max 19.3 (71%) and VE/VCO2 36.    NYHA Functional Class: 1  ACC/AHA Stage B heart failure  Volume status: Euvolaemic per hx  Perfusion status: Mentating well  Aetiology: ICM  Renal function, 6/8/2023: BUN/SCr 24/2.2 K 5.2 GFR 31      PLAN:    #HFimprovedEF/ischemic cardiomyopathy    -TTE pre next visit   -Heart failure lifestyle modifications discussed and Qs answered.     -Medication optimisation:  BB: Continue carvedilol 12.5 mg twice daily  RAASi: Continue sacubitril/valsartan to 97/103 mg twice daily  AA: Continued decreased dose of spironolactone 12.5 mg once daily, hyperK on Ruel 25 mg qd  SGLT2i: Continue empagliflozin 10 mg once daily  CCB: Continue to hold amlodipine 10 mg once daily to make blood pressure room for continuation of ARNi  HCTZ: Discontinued previously to make blood pressure room for continuation of ARNI dose  Hydralazine/Isosorbide dinitrate: Will be commenced pending blood pressure room on in person assessment    COUNSELING:   We discussed the following non-pharmacological measures during this visit:  ·Smoking and alcohol abstinence/cessation, if applicable.  ·Dietary and medication compliance (in particular, salt restriction)  ·Monitoring daily weights and blood pressures  ·Exercise regimen (walking)      This note was transcribed using the Dragon Dictation system. There may be grammatical, punctuation, or verbiage errors that can occur with voice recognition programs.     Bella Garcia MD PhD    Addendum  Mild hyperkalemia noted on today's labs.  Spironolactone  will be further decreased to 12.5 mg every other day.  Bella Garcia MD PhD

## 2024-05-21 LAB
ATRIAL RATE: 58 BPM
P AXIS: -1 DEGREES
P OFFSET: 196 MS
P ONSET: 143 MS
PR INTERVAL: 150 MS
Q ONSET: 218 MS
QRS COUNT: 10 BEATS
QRS DURATION: 96 MS
QT INTERVAL: 434 MS
QTC CALCULATION(BAZETT): 426 MS
QTC FREDERICIA: 428 MS
R AXIS: 53 DEGREES
T AXIS: 26 DEGREES
T OFFSET: 435 MS
VENTRICULAR RATE: 58 BPM

## 2024-06-27 ENCOUNTER — TELEPHONE (OUTPATIENT)
Dept: IMMUNOLOGY | Facility: CLINIC | Age: 74
End: 2024-06-27
Payer: COMMERCIAL

## 2024-06-27 NOTE — TELEPHONE ENCOUNTER
Sw calls to check in with Pt re: upcoming ADAP renewal due tomorrow, 6/30. Pt to email to this Sw today. Sw will follow.

## 2024-07-11 ENCOUNTER — OFFICE VISIT (OUTPATIENT)
Dept: IMMUNOLOGY | Facility: CLINIC | Age: 74
End: 2024-07-11
Payer: MEDICARE

## 2024-07-11 VITALS
TEMPERATURE: 97.6 F | HEART RATE: 62 BPM | OXYGEN SATURATION: 100 % | BODY MASS INDEX: 19.99 KG/M2 | SYSTOLIC BLOOD PRESSURE: 145 MMHG | HEIGHT: 69 IN | RESPIRATION RATE: 18 BRPM | DIASTOLIC BLOOD PRESSURE: 93 MMHG | WEIGHT: 135 LBS

## 2024-07-11 DIAGNOSIS — B20 AIDS (MULTI): Primary | ICD-10-CM

## 2024-07-11 DIAGNOSIS — Z79.899 HIGH RISK MEDICATION USE: ICD-10-CM

## 2024-07-11 LAB
ALBUMIN SERPL BCP-MCNC: 4.4 G/DL (ref 3.4–5)
ALP SERPL-CCNC: 30 U/L (ref 33–136)
ALT SERPL W P-5'-P-CCNC: 10 U/L (ref 10–52)
ANION GAP SERPL CALC-SCNC: 14 MMOL/L (ref 10–20)
AST SERPL W P-5'-P-CCNC: 18 U/L (ref 9–39)
BASOPHILS # BLD AUTO: 0.02 X10*3/UL (ref 0–0.1)
BASOPHILS NFR BLD AUTO: 0.5 %
BILIRUB DIRECT SERPL-MCNC: 0.1 MG/DL (ref 0–0.3)
BILIRUB SERPL-MCNC: 0.5 MG/DL (ref 0–1.2)
BUN SERPL-MCNC: 24 MG/DL (ref 6–23)
CALCIUM SERPL-MCNC: 10.2 MG/DL (ref 8.6–10.6)
CD3+CD4+ CELLS # BLD: 0.26 X10E9/L
CD3+CD4+ CELLS # BLD: 257 /MM3
CD3+CD4+ CELLS NFR BLD: 22 %
CD3+CD4+ CELLS/CD3+CD8+ CLL BLD: 0.33 %
CD3+CD8+ CELLS # BLD: 0.78 X10E9/L
CD3+CD8+ CELLS NFR BLD: 67 %
CHLORIDE SERPL-SCNC: 104 MMOL/L (ref 98–107)
CO2 SERPL-SCNC: 27 MMOL/L (ref 21–32)
CREAT SERPL-MCNC: 1.7 MG/DL (ref 0.5–1.3)
EGFRCR SERPLBLD CKD-EPI 2021: 42 ML/MIN/1.73M*2
EOSINOPHIL # BLD AUTO: 0.06 X10*3/UL (ref 0–0.4)
EOSINOPHIL NFR BLD AUTO: 1.6 %
ERYTHROCYTE [DISTWIDTH] IN BLOOD BY AUTOMATED COUNT: 15.1 % (ref 11.5–14.5)
GLUCOSE SERPL-MCNC: 74 MG/DL (ref 74–99)
HCT VFR BLD AUTO: 40.3 % (ref 41–52)
HGB BLD-MCNC: 12.9 G/DL (ref 13.5–17.5)
IMM GRANULOCYTES # BLD AUTO: 0.01 X10*3/UL (ref 0–0.5)
IMM GRANULOCYTES NFR BLD AUTO: 0.3 % (ref 0–0.9)
LYMPHOCYTES # BLD AUTO: 1.17 X10*3/UL (ref 0.8–3)
LYMPHOCYTES # SPEC AUTO: 1.17 X10*3/UL
LYMPHOCYTES NFR BLD AUTO: 30.8 %
MCH RBC QN AUTO: 31.7 PG (ref 26–34)
MCHC RBC AUTO-ENTMCNC: 32 G/DL (ref 32–36)
MCV RBC AUTO: 99 FL (ref 80–100)
MONOCYTES # BLD AUTO: 0.32 X10*3/UL (ref 0.05–0.8)
MONOCYTES NFR BLD AUTO: 8.4 %
NEUTROPHILS # BLD AUTO: 2.22 X10*3/UL (ref 1.6–5.5)
NEUTROPHILS NFR BLD AUTO: 58.4 %
NRBC BLD-RTO: 0 /100 WBCS (ref 0–0)
PHOSPHATE SERPL-MCNC: 4.1 MG/DL (ref 2.5–4.9)
PLATELET # BLD AUTO: 209 X10*3/UL (ref 150–450)
POTASSIUM SERPL-SCNC: 4.8 MMOL/L (ref 3.5–5.3)
PROT SERPL-MCNC: 7.2 G/DL (ref 6.4–8.2)
RBC # BLD AUTO: 4.07 X10*6/UL (ref 4.5–5.9)
SODIUM SERPL-SCNC: 140 MMOL/L (ref 136–145)
WBC # BLD AUTO: 3.8 X10*3/UL (ref 4.4–11.3)

## 2024-07-11 PROCEDURE — 3080F DIAST BP >= 90 MM HG: CPT | Performed by: INTERNAL MEDICINE

## 2024-07-11 PROCEDURE — 1160F RVW MEDS BY RX/DR IN RCRD: CPT | Performed by: INTERNAL MEDICINE

## 2024-07-11 PROCEDURE — 3077F SYST BP >= 140 MM HG: CPT | Performed by: INTERNAL MEDICINE

## 2024-07-11 PROCEDURE — 1159F MED LIST DOCD IN RCRD: CPT | Performed by: INTERNAL MEDICINE

## 2024-07-11 PROCEDURE — 84100 ASSAY OF PHOSPHORUS: CPT | Performed by: INTERNAL MEDICINE

## 2024-07-11 PROCEDURE — 90677 PCV20 VACCINE IM: CPT | Performed by: INTERNAL MEDICINE

## 2024-07-11 PROCEDURE — 80048 BASIC METABOLIC PNL TOTAL CA: CPT | Performed by: INTERNAL MEDICINE

## 2024-07-11 PROCEDURE — 85025 COMPLETE CBC W/AUTO DIFF WBC: CPT | Performed by: INTERNAL MEDICINE

## 2024-07-11 PROCEDURE — 82248 BILIRUBIN DIRECT: CPT | Performed by: INTERNAL MEDICINE

## 2024-07-11 PROCEDURE — 36415 COLL VENOUS BLD VENIPUNCTURE: CPT | Performed by: INTERNAL MEDICINE

## 2024-07-11 PROCEDURE — 88185 FLOWCYTOMETRY/TC ADD-ON: CPT | Mod: TC | Performed by: INTERNAL MEDICINE

## 2024-07-11 PROCEDURE — 87536 HIV-1 QUANT&REVRSE TRNSCRPJ: CPT | Performed by: INTERNAL MEDICINE

## 2024-07-11 PROCEDURE — 99214 OFFICE O/P EST MOD 30 MIN: CPT | Mod: 25 | Performed by: INTERNAL MEDICINE

## 2024-07-11 PROCEDURE — 99214 OFFICE O/P EST MOD 30 MIN: CPT | Performed by: INTERNAL MEDICINE

## 2024-07-11 PROCEDURE — 1036F TOBACCO NON-USER: CPT | Performed by: INTERNAL MEDICINE

## 2024-07-11 PROCEDURE — 1126F AMNT PAIN NOTED NONE PRSNT: CPT | Performed by: INTERNAL MEDICINE

## 2024-07-11 ASSESSMENT — ENCOUNTER SYMPTOMS
MYALGIAS: 0
WEAKNESS: 0
DYSURIA: 0
PALPITATIONS: 0
DIFFICULTY URINATING: 0
POLYDIPSIA: 0
COUGH: 0
NAUSEA: 0
JOINT SWELLING: 0
DIARRHEA: 0
CONSTIPATION: 0
CHILLS: 0
FEVER: 0
VOMITING: 0
FREQUENCY: 0
HEADACHES: 0
ARTHRALGIAS: 0
ACTIVITY CHANGE: 0
NUMBNESS: 0
SHORTNESS OF BREATH: 0
RECTAL PAIN: 0
DIZZINESS: 0

## 2024-07-11 ASSESSMENT — PAIN SCALES - GENERAL: PAINLEVEL: 0-NO PAIN

## 2024-07-11 NOTE — PROGRESS NOTES
HIV Clinic Follow-up Visit:    Ephraim Michel was last seen in [facility] on 6/27/2024    Missed antiretroviral doses in last 72 hours? No    Sexually active? no, Partner/s aware of diagnosis? NA,     Condom use?  NA , Partner on PrEP? NA    Tobacco use: No     SUBJECTIVE:   Here for follow-up.  Doing well.  No ER or UC visits  Exercising 30 minutes daily on a cross-country ski machine.  He has had no cardiac symptoms.  No fainting.  Drinks 2 large glasses of water before bed and has nocturia.  Aware that this is likely the cause.  No issues with urgency or frequency during daytime. Has complete bladder emptying.    Review of Systems  Review of Systems   Constitutional:  Negative for activity change, chills and fever.   HENT:  Negative for hearing loss and postnasal drip.    Eyes:  Negative for visual disturbance.   Respiratory:  Negative for cough and shortness of breath.    Cardiovascular:  Negative for chest pain and palpitations.   Gastrointestinal:  Negative for constipation, diarrhea, nausea, rectal pain and vomiting.   Endocrine: Negative for polydipsia.   Genitourinary:  Negative for difficulty urinating, dysuria, frequency and urgency.   Musculoskeletal:  Negative for arthralgias, joint swelling and myalgias.   Skin:  Negative for rash.   Neurological:  Negative for dizziness, weakness, numbness and headaches.       CURRENT MEDICATIONS:    Current Outpatient Medications:     abacavir-lamiVUDine (Epzicom) 600-300 mg tablet, TAKE 1 TABLET BY MOUTH EVERY DAY, Disp: 30 tablet, Rfl: 5    aspirin 81 mg EC tablet, Take 1 tablet (81 mg) by mouth once daily., Disp: , Rfl:     atorvastatin (Lipitor) 80 mg tablet, TAKE 1 TABLET BY MOUTH EVERY DAY, Disp: 90 tablet, Rfl: 3    blood pressure test kit-small kit, 1 kit once daily., Disp: 1 each, Rfl: 0    brimonidine (AlphaGAN) 0.2 % ophthalmic solution, Administer 1 drop into both eyes 2 times a day., Disp: , Rfl:     carvedilol (Coreg) 12.5 mg tablet, Take 1 tablet (12.5 mg) by  "mouth 2 times a day., Disp: , Rfl:     cholecalciferol (Vitamin D-3) 10 mcg (400 unit) tablet,chewable, Chew 2 tablets (800 Units) once daily., Disp: , Rfl:     cyanocobalamin (Vitamin B-12) 100 mcg tablet, Take 1 tablet (100 mcg) by mouth once daily., Disp: , Rfl:     dapagliflozin propanediol (Farxiga) 10 mg, Take 1 tablet (10 mg) by mouth once daily., Disp: 90 tablet, Rfl: 3    Entresto 49-51 mg tablet, Take 1 tablet by mouth 2 times a day., Disp: , Rfl:     glucosam garber dip-chondroit-C-Mn 201-871-79-3 mg capsule, Take 2 capsules by mouth once daily., Disp: , Rfl:     multivitamin (One Daily Multivitamin) tablet, Take 1 tablet by mouth once daily., Disp: , Rfl:     Nexlizet 180-10 mg tablet, Take 1 tablet by mouth once daily., Disp: 30 tablet, Rfl: 7    spironolactone (Aldactone) 25 mg tablet, Take 0.5 tablets (12.5 mg) by mouth once daily., Disp: , Rfl:     Tivicay 50 mg tablet, Take 1 tablet (50 mg) by mouth once daily., Disp: 30 tablet, Rfl: 5    travoprost (Travatan Z) 0.004 % drops ophthalmic solution, Administer 1 drop into both eyes once daily at bedtime., Disp: , Rfl:     ferric carboxymaltose (Injectafer) 50 mg iron/mL injection, Administer intravenously once a week for 2 weeks (Patient not taking: Reported on 12/7/2023), Disp: 30 mL, Rfl: 0    losartan (Cozaar) 100 mg tablet, Take 1 tablet (100 mg) by mouth once daily. (Patient not taking: Reported on 5/16/2024), Disp: 30 tablet, Rfl: 11    PHYSICAL EXAMINATION:  Visit Vitals  BP (!) 145/93   Pulse 62   Temp 36.4 °C (97.6 °F) (Skin)   Resp 18   Ht 1.753 m (5' 9\")   Wt 61.2 kg (135 lb)   SpO2 100%   BMI 19.94 kg/m²   Smoking Status Former   BSA 1.73 m²       Physical Exam   Physical Exam  Constitutional:       General: He is not in acute distress.     Appearance: He is not ill-appearing.   HENT:      Head: Normocephalic and atraumatic.      Mouth/Throat:      Mouth: Mucous membranes are moist.      Pharynx: No oropharyngeal exudate or posterior " oropharyngeal erythema.   Eyes:      General: No scleral icterus.     Extraocular Movements: Extraocular movements intact.      Conjunctiva/sclera: Conjunctivae normal.      Pupils: Pupils are equal, round, and reactive to light.   Cardiovascular:      Rate and Rhythm: Normal rate and regular rhythm.      Heart sounds: No murmur heard.     No friction rub. No gallop.   Pulmonary:      Effort: Pulmonary effort is normal. No respiratory distress.      Breath sounds: Normal breath sounds. No wheezing, rhonchi or rales.   Abdominal:      General: Bowel sounds are normal.      Palpations: Abdomen is soft. There is no mass.      Tenderness: There is no abdominal tenderness. There is no guarding.   Musculoskeletal:         General: No swelling or tenderness. Normal range of motion.      Cervical back: Normal range of motion and neck supple. No tenderness.   Lymphadenopathy:      Cervical: No cervical adenopathy.   Skin:     Findings: No erythema or rash.   Neurological:      General: No focal deficit present.      Mental Status: He is alert and oriented to person, place, and time.      Cranial Nerves: No cranial nerve deficit.      Motor: No weakness.      Deep Tendon Reflexes: Reflexes normal.         PERTINENT DATA:  CBC:  WBC   Date Value Ref Range Status   05/16/2024 6.1 4.4 - 11.3 x10*3/uL Final     nRBC   Date Value Ref Range Status   05/16/2024 0.0 0.0 - 0.0 /100 WBCs Final     RBC   Date Value Ref Range Status   05/16/2024 4.11 (L) 4.50 - 5.90 x10*6/uL Final     Hemoglobin   Date Value Ref Range Status   05/16/2024 12.6 (L) 13.5 - 17.5 g/dL Final     MCV   Date Value Ref Range Status   05/16/2024 98 80 - 100 fL Final     RDW   Date Value Ref Range Status   05/16/2024 14.7 (H) 11.5 - 14.5 % Final       Renal Function Panel:  Glucose   Date Value Ref Range Status   05/16/2024 88 74 - 99 mg/dL Final     Sodium   Date Value Ref Range Status   05/16/2024 140 136 - 145 mmol/L Final     Potassium   Date Value Ref Range  Status   05/16/2024 5.5 (H) 3.5 - 5.3 mmol/L Final     Chloride   Date Value Ref Range Status   05/16/2024 104 98 - 107 mmol/L Final     Anion Gap   Date Value Ref Range Status   05/16/2024 16 10 - 20 mmol/L Final     Urea Nitrogen   Date Value Ref Range Status   05/16/2024 27 (H) 6 - 23 mg/dL Final     Creatinine   Date Value Ref Range Status   05/16/2024 1.78 (H) 0.50 - 1.30 mg/dL Final     eGFR   Date Value Ref Range Status   05/16/2024 40 (L) >60 mL/min/1.73m*2 Final     Comment:     Calculations of estimated GFR are performed using the 2021 CKD-EPI Study Refit equation without the race variable for the IDMS-Traceable creatinine methods.  https://jasn.asnjournals.org/content/early/2021/09/22/ASN.5055615766     Calcium   Date Value Ref Range Status   05/16/2024 9.8 8.6 - 10.6 mg/dL Final     Phosphorus   Date Value Ref Range Status   05/16/2024 4.6 2.5 - 4.9 mg/dL Final     Comment:     The performance characteristics of phosphorus testing in heparinized plasma have been validated by the individual  laboratory site where testing is performed. Testing on heparinized plasma is not approved by the FDA; however, such approval is not necessary.     Albumin   Date Value Ref Range Status   05/16/2024 4.0 3.4 - 5.0 g/dL Final       Hepatic Panel:  Albumin   Date Value Ref Range Status   05/16/2024 4.0 3.4 - 5.0 g/dL Final     Bilirubin, Total   Date Value Ref Range Status   12/07/2023 0.5 0.0 - 1.2 mg/dL Final     Bilirubin, Direct   Date Value Ref Range Status   12/07/2023 0.1 0.0 - 0.3 mg/dL Final     Alkaline Phosphatase   Date Value Ref Range Status   12/07/2023 33 33 - 136 U/L Final     ALT   Date Value Ref Range Status   12/07/2023 13 10 - 52 U/L Final     Comment:     Patients treated with Sulfasalazine may generate falsely decreased results for ALT.       HIV Viral Load:  HIV-1 RNA PCR Viral Load Log   Date Value Ref Range Status   12/07/2023 1.36 log10 cpy/mL Final     HIV RNA Result   Date Value Ref Range  Status   12/07/2023 Detected (A) Not Detected Final       CD4 Count:  CD3+CD4+%   Date Value Ref Range Status   12/07/2023 18 (L) 29 - 57 % Final     CD3+CD4+ Absolute   Date Value Ref Range Status   12/07/2023 0.211 (L) 0.350 - 2.740 x10E9/L Final     CD3+CD8+%   Date Value Ref Range Status   12/07/2023 67 (H) 7 - 31 % Final     CD3+CD8+ Absolute   Date Value Ref Range Status   12/07/2023 0.784 0.080 - 1.490 x10E9/L Final     CD4/CD8 Ratio   Date Value Ref Range Status   12/07/2023 0.27 (L) 1.00 - 2.60 Final     CD45%   Date Value Ref Range Status   06/08/2023 100 % Final       CRCL:  Creatinine, Urine   Date Value Ref Range Status   08/25/2020 66.7 20.0 - 370.0 mg/dL Final       Lipid Panel:  HDL   Date Value Ref Range Status   09/29/2023 88.2 mg/dL Final     Comment:     .      AGE      VERY LOW   LOW     NORMAL    HIGH       0-19 Y       < 35   < 40     40-45     ----    20-24 Y       ----   < 40       >45     ----      >24 Y       ----   < 40     40-60      >60  .       Cholesterol/HDL Ratio   Date Value Ref Range Status   09/29/2023 1.9  Final     Comment:     REF VALUES  DESIRABLE  < 3.4  HIGH RISK  > 5.0       LDL   Date Value Ref Range Status   09/29/2023 69 0 - 99 mg/dL Final     Comment:     .                           NEAR      BORD      AGE      DESIRABLE  OPTIMAL    HIGH     HIGH     VERY HIGH     0-19 Y     0 - 109     ---    110-129   >/= 130     ----    20-24 Y     0 - 119     ---    120-159   >/= 160     ----      >24 Y     0 -  99   100-129  130-159   160-189     >/=190  .       VLDL   Date Value Ref Range Status   09/29/2023 13 0 - 40 mg/dL Final     Triglycerides   Date Value Ref Range Status   09/29/2023 65 0 - 149 mg/dL Final     Comment:     .      AGE      DESIRABLE   BORDERLINE HIGH   HIGH     VERY HIGH   0 D-90 D    19 - 174         ----         ----        ----  91 D- 9 Y     0 -  74        75 -  99     >/= 100      ----    10-19 Y     0 -  89        90 - 129     >/= 130      ----     "20-24 Y     0 - 114       115 - 149     >/= 150      ----         >24 Y     0 - 149       150 - 199    200- 499    >/= 500  .   Venipuncture immediately after or during the    administration of Metamizole may lead to falsely   low results. Testing should be performed immediately   prior to Metamizole dosing.         HgbA1c:  No results found for: \"HGBA1C\"    The ASCVD Risk score (William OROPEZA, et al., 2019) failed to calculate for the following reasons:    Unable to determine if patient is Non-     ASSESSMENT / PLAN:    Problem List Items Addressed This Visit       AIDS (Multi) - Primary (Chronic)    Overview     Hx entered by HIV Care provider: DO NOT DELETE    Initially diagnosed on 2/8/2022.  CD4 shakir of 3 on 2/28/02    Pertinent Screens:  HAV ab: NR  2/12/02; Post vaccine check  NR on5/7/07  HBsAg: NR 7/8/21  HBsAB: 3.1 (neg) 7/29/21 after 4 doses vax 4282-3113  HCV Ab: NR 2/12/02  CMV IgG: Neg  2/12/02  Toxo IgG: Neg 2/12/02  G6PD: NL 2/12/02  HLA B57-01:  Neg 2/25/10  PPD negative 11/20/17  Syphilis IgG neg 11/19/18  Anal PAP: Neg  3/19/12    HAART Hx:  CBV+EFV: 3/14/02 -3/4/04.  CBV +ANAM/r:  3/4/04- 10/30/06  TVD+ANAM/r: 10/30/06 - 4/9/2012.  Switched TVD to  Epzicom (EPZ) on 4/9/2012. Regimen was EPZ+ANAM/r from 4/9/12 - 4/24/17.  Switched to EPZ + Dolutegravir on 4/24/17 to remove boosting agent of RTV to allow treatment of elevated cholesterol with newer agents.    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 Nearly virologically suppressed at last vist (23) on current anti-retroviral agents. Likely just test variation  Routine labs will be collected today: CBC w/ diff, Hepatic panel, renal panel, CD4 and HIV RNA PCR.  Vaccines updated: Prevnar 20 ordered.  He will get the RSV vaccine at the [facility] near his home.  Flu shot in late Sept/early October  STD screening not performed as there is " no indication as not sexually active.  Will RTC in 6 months           Relevant Orders    CBC and Auto Differential    CD4/8 Panel    HIV RNA, quantitative, PCR    Pneumococcal conjugate vaccine, 20-valent (PREVNAR 20)    High risk medication use    Relevant Orders    Hepatic Function Panel    Basic Metabolic Panel    Phosphorus       Nadia Molina MD

## 2024-07-11 NOTE — ASSESSMENT & PLAN NOTE
Exercising regularly.  Wt is low.  BMI at 19.9  Stressed that he should not lose more weight.  Dietician to speak with him today as routine

## 2024-07-11 NOTE — ASSESSMENT & PLAN NOTE
Immunologically intact, though CD4 remains in 200s. Nearly virologically suppressed at last vist (23) on current anti-retroviral agents. Likely just test variation  Routine labs will be collected today: CBC w/ diff, Hepatic panel, renal panel, CD4 and HIV RNA PCR.  Vaccines updated: Prevnar 20 ordered.  He will get the RSV vaccine at the [facility] near his home.  Flu shot in late Sept/early October  STD screening not performed as there is no indication as not sexually active.  Will RTC in 6 months

## 2024-07-11 NOTE — PATIENT INSTRUCTIONS
It was great to see you today!  Your last blood work that we did in clinic showed that your T-cells (CD4 count) was 211 / 18 %.  Your virus was nearly fully suppressed at  23 copies.  Keep up the great work taking your medications.  We collected routine blood work today.  I will see you back in clinic in 6 months.  If any issues should arise before then, please remember to call the clinic and get in contact with your nurse.    Remember  to get your flu shot in October.   Consider getting the RSV vaccine soon.    Wishing you and your loved ones a very Happy Holiday Season!!  Be safe and see you next year!!

## 2024-07-12 LAB
HIV1 RNA # PLAS NAA DL=20: NOT DETECTED {COPIES}/ML
HIV1 RNA SPEC NAA+PROBE-LOG#: NORMAL {LOG_COPIES}/ML

## 2024-08-15 DIAGNOSIS — B20 HIV INFECTION, UNSPECIFIED SYMPTOM STATUS (MULTI): ICD-10-CM

## 2024-08-15 RX ORDER — DOLUTEGRAVIR SODIUM 50 MG/1
50 TABLET, FILM COATED ORAL DAILY
Qty: 30 TABLET | Refills: 5 | Status: SHIPPED | OUTPATIENT
Start: 2024-08-15

## 2024-09-11 DIAGNOSIS — B20 HIV DISEASE (MULTI): Primary | ICD-10-CM

## 2024-09-11 DIAGNOSIS — I25.5 ISCHEMIC DILATED CARDIOMYOPATHY (MULTI): Primary | ICD-10-CM

## 2024-09-11 DIAGNOSIS — B20 HUMAN IMMUNODEFICIENCY VIRUS (HIV) DISEASE (MULTI): ICD-10-CM

## 2024-09-11 DIAGNOSIS — I42.0 ISCHEMIC DILATED CARDIOMYOPATHY (MULTI): Primary | ICD-10-CM

## 2024-09-11 RX ORDER — SACUBITRIL AND VALSARTAN 49; 51 MG/1; MG/1
1 TABLET, FILM COATED ORAL 2 TIMES DAILY
Qty: 60 TABLET | Refills: 6 | Status: SHIPPED | OUTPATIENT
Start: 2024-09-11

## 2024-09-11 RX ORDER — ABACAVIR AND LAMIVUDINE 600; 300 MG/1; MG/1
1 TABLET, FILM COATED ORAL DAILY
Qty: 30 TABLET | Refills: 5 | Status: SHIPPED | OUTPATIENT
Start: 2024-09-11

## 2024-09-17 DIAGNOSIS — I42.0 ISCHEMIC DILATED CARDIOMYOPATHY (MULTI): Primary | ICD-10-CM

## 2024-09-17 DIAGNOSIS — I25.5 ISCHEMIC DILATED CARDIOMYOPATHY (MULTI): Primary | ICD-10-CM

## 2024-09-17 RX ORDER — CARVEDILOL 12.5 MG/1
12.5 TABLET ORAL 2 TIMES DAILY
Qty: 60 TABLET | Refills: 5 | Status: SHIPPED | OUTPATIENT
Start: 2024-09-17

## 2024-09-30 DIAGNOSIS — E78.2 MIXED HYPERLIPIDEMIA: ICD-10-CM

## 2024-10-05 ENCOUNTER — LAB (OUTPATIENT)
Dept: LAB | Facility: LAB | Age: 74
End: 2024-10-05
Payer: COMMERCIAL

## 2024-10-05 DIAGNOSIS — E78.2 MIXED HYPERLIPIDEMIA: ICD-10-CM

## 2024-10-05 LAB
CHOLEST SERPL-MCNC: 153 MG/DL (ref 0–199)
CHOLESTEROL/HDL RATIO: 2.2
HDLC SERPL-MCNC: 70.5 MG/DL
LDLC SERPL CALC-MCNC: 69 MG/DL
NON HDL CHOLESTEROL: 83 MG/DL (ref 0–149)
TRIGL SERPL-MCNC: 67 MG/DL (ref 0–149)
VLDL: 13 MG/DL (ref 0–40)

## 2024-10-05 PROCEDURE — 80061 LIPID PANEL: CPT

## 2024-10-22 DIAGNOSIS — E78.5 HYPERLIPIDEMIA, UNSPECIFIED HYPERLIPIDEMIA TYPE: ICD-10-CM

## 2024-10-22 RX ORDER — ATORVASTATIN CALCIUM 80 MG/1
80 TABLET, FILM COATED ORAL DAILY
Qty: 90 TABLET | Refills: 3 | Status: SHIPPED | OUTPATIENT
Start: 2024-10-22

## 2024-11-18 ENCOUNTER — HOSPITAL ENCOUNTER (OUTPATIENT)
Dept: CARDIOLOGY | Facility: CLINIC | Age: 74
Discharge: HOME | End: 2024-11-18
Payer: MEDICARE

## 2024-11-18 DIAGNOSIS — I25.5 ISCHEMIC DILATED CARDIOMYOPATHY (MULTI): ICD-10-CM

## 2024-11-18 DIAGNOSIS — I42.0 ISCHEMIC DILATED CARDIOMYOPATHY (MULTI): ICD-10-CM

## 2024-11-18 LAB
AORTIC VALVE PEAK VELOCITY: 1.5 M/S
AV PEAK GRADIENT: 9 MMHG
AVA (PEAK VEL): 2.74 CM2
EJECTION FRACTION APICAL 4 CHAMBER: 46.4
EJECTION FRACTION: 53 %
LEFT ATRIUM VOLUME AREA LENGTH INDEX BSA: 39.4 ML/M2
LEFT VENTRICLE INTERNAL DIMENSION DIASTOLE: 5.65 CM (ref 3.5–6)
LEFT VENTRICULAR OUTFLOW TRACT DIAMETER: 2.32 CM
MITRAL VALVE E/A RATIO: 0.81
RIGHT VENTRICLE FREE WALL PEAK S': 12 CM/S
RIGHT VENTRICLE PEAK SYSTOLIC PRESSURE: 38.7 MMHG
TRICUSPID ANNULAR PLANE SYSTOLIC EXCURSION: 1.6 CM

## 2024-11-18 PROCEDURE — 93306 TTE W/DOPPLER COMPLETE: CPT | Performed by: INTERNAL MEDICINE

## 2024-11-18 PROCEDURE — 93306 TTE W/DOPPLER COMPLETE: CPT

## 2024-12-13 ENCOUNTER — LAB (OUTPATIENT)
Dept: LAB | Facility: LAB | Age: 74
End: 2024-12-13
Payer: MEDICARE

## 2024-12-13 ENCOUNTER — OFFICE VISIT (OUTPATIENT)
Dept: CARDIOLOGY | Facility: HOSPITAL | Age: 74
End: 2024-12-13
Payer: MEDICARE

## 2024-12-13 VITALS
BODY MASS INDEX: 19.53 KG/M2 | OXYGEN SATURATION: 98 % | HEART RATE: 71 BPM | HEIGHT: 70 IN | WEIGHT: 136.4 LBS | SYSTOLIC BLOOD PRESSURE: 156 MMHG | DIASTOLIC BLOOD PRESSURE: 95 MMHG

## 2024-12-13 DIAGNOSIS — I25.5 ISCHEMIC DILATED CARDIOMYOPATHY (MULTI): ICD-10-CM

## 2024-12-13 DIAGNOSIS — I42.0 ISCHEMIC DILATED CARDIOMYOPATHY (MULTI): Primary | ICD-10-CM

## 2024-12-13 DIAGNOSIS — I25.10 ARTERIOSCLEROSIS OF CORONARY ARTERY: ICD-10-CM

## 2024-12-13 DIAGNOSIS — I42.0 ISCHEMIC DILATED CARDIOMYOPATHY (MULTI): ICD-10-CM

## 2024-12-13 DIAGNOSIS — I25.5 ISCHEMIC DILATED CARDIOMYOPATHY (MULTI): Primary | ICD-10-CM

## 2024-12-13 LAB
ALBUMIN SERPL BCP-MCNC: 4.2 G/DL (ref 3.4–5)
ANION GAP SERPL CALC-SCNC: 16 MMOL/L (ref 10–20)
BNP SERPL-MCNC: 367 PG/ML (ref 0–99)
BUN SERPL-MCNC: 23 MG/DL (ref 6–23)
CALCIUM SERPL-MCNC: 9.8 MG/DL (ref 8.6–10.6)
CHLORIDE SERPL-SCNC: 105 MMOL/L (ref 98–107)
CO2 SERPL-SCNC: 26 MMOL/L (ref 21–32)
CREAT SERPL-MCNC: 1.81 MG/DL (ref 0.5–1.3)
EGFRCR SERPLBLD CKD-EPI 2021: 39 ML/MIN/1.73M*2
ERYTHROCYTE [DISTWIDTH] IN BLOOD BY AUTOMATED COUNT: 13.6 % (ref 11.5–14.5)
GLUCOSE SERPL-MCNC: 76 MG/DL (ref 74–99)
HCT VFR BLD AUTO: 39.6 % (ref 41–52)
HGB BLD-MCNC: 12.8 G/DL (ref 13.5–17.5)
MCH RBC QN AUTO: 32.4 PG (ref 26–34)
MCHC RBC AUTO-ENTMCNC: 32.3 G/DL (ref 32–36)
MCV RBC AUTO: 100 FL (ref 80–100)
NRBC BLD-RTO: 0 /100 WBCS (ref 0–0)
PHOSPHATE SERPL-MCNC: 4.1 MG/DL (ref 2.5–4.9)
PLATELET # BLD AUTO: 203 X10*3/UL (ref 150–450)
POTASSIUM SERPL-SCNC: 4.9 MMOL/L (ref 3.5–5.3)
RBC # BLD AUTO: 3.95 X10*6/UL (ref 4.5–5.9)
SODIUM SERPL-SCNC: 142 MMOL/L (ref 136–145)
WBC # BLD AUTO: 4.2 X10*3/UL (ref 4.4–11.3)

## 2024-12-13 PROCEDURE — 80069 RENAL FUNCTION PANEL: CPT

## 2024-12-13 PROCEDURE — 99215 OFFICE O/P EST HI 40 MIN: CPT | Performed by: STUDENT IN AN ORGANIZED HEALTH CARE EDUCATION/TRAINING PROGRAM

## 2024-12-13 PROCEDURE — 3077F SYST BP >= 140 MM HG: CPT | Performed by: STUDENT IN AN ORGANIZED HEALTH CARE EDUCATION/TRAINING PROGRAM

## 2024-12-13 PROCEDURE — 1126F AMNT PAIN NOTED NONE PRSNT: CPT | Performed by: STUDENT IN AN ORGANIZED HEALTH CARE EDUCATION/TRAINING PROGRAM

## 2024-12-13 PROCEDURE — 1036F TOBACCO NON-USER: CPT | Performed by: STUDENT IN AN ORGANIZED HEALTH CARE EDUCATION/TRAINING PROGRAM

## 2024-12-13 PROCEDURE — 3008F BODY MASS INDEX DOCD: CPT | Performed by: STUDENT IN AN ORGANIZED HEALTH CARE EDUCATION/TRAINING PROGRAM

## 2024-12-13 PROCEDURE — 1159F MED LIST DOCD IN RCRD: CPT | Performed by: STUDENT IN AN ORGANIZED HEALTH CARE EDUCATION/TRAINING PROGRAM

## 2024-12-13 PROCEDURE — 85027 COMPLETE CBC AUTOMATED: CPT

## 2024-12-13 PROCEDURE — 83880 ASSAY OF NATRIURETIC PEPTIDE: CPT

## 2024-12-13 PROCEDURE — 3080F DIAST BP >= 90 MM HG: CPT | Performed by: STUDENT IN AN ORGANIZED HEALTH CARE EDUCATION/TRAINING PROGRAM

## 2024-12-13 PROCEDURE — 36415 COLL VENOUS BLD VENIPUNCTURE: CPT

## 2024-12-13 RX ORDER — BEMPEDOIC ACID AND EZETIMIBE 180; 10 MG/1; MG/1
1 TABLET, FILM COATED ORAL DAILY
Qty: 90 TABLET | Refills: 3 | Status: SHIPPED | OUTPATIENT
Start: 2024-12-13 | End: 2025-12-13

## 2024-12-13 ASSESSMENT — PAIN SCALES - GENERAL: PAINLEVEL_OUTOF10: 0-NO PAIN

## 2024-12-13 ASSESSMENT — ENCOUNTER SYMPTOMS
NAUSEA: 0
WHEEZING: 0
FEVER: 0
DIARRHEA: 0
CONSTIPATION: 0
DIZZINESS: 0
SHORTNESS OF BREATH: 0
WEIGHT GAIN: 0
LIGHT-HEADEDNESS: 0
FREQUENCY: 0
CHILLS: 0
VOMITING: 0
SYNCOPE: 0
DYSPNEA ON EXERTION: 0
WEIGHT LOSS: 0
PALPITATIONS: 0
COUGH: 0
WEAKNESS: 0

## 2024-12-13 NOTE — PATIENT INSTRUCTIONS
Thank you for coming in today. If you have any questions or concerns, you may call the Heart Failure Office at [phone] option [phone], or [phone].  You may also contact our heart failure nursing team via email on [email or web address].    For quicker results set-up your  [employer] account to receive results and other correspondence directly to your phone.    Please bring all your pills/medications to your Cardiology appointments.    **  - No new medication changes today    -We will renew your Nexlizet medications today    - Please have the following tests done:  1.Blood tests today (RFP, BNP, CBC)    - Please make an appointment to be seen in 08/2025

## 2024-12-13 NOTE — PROGRESS NOTES
"Referring Clinician: Dr. Erik Sanchez  Former cardiologist: Dr. Erik Sanchez     Chief Complaint  Ambulatory heart failure care      History of Present Illness  74 y.o. retired [employer]  who presents for advanced heart failure care. He has a past medical history significant for CAD status post CABG 2003 with Dr Sanders (unknown anatomy) and documented past PCI to one of his vein grafts in 2004, ischemic cardiomyopathy, HFimprovedEF and on TTE 10/2021 LVEF 45-50% LVIDD 5.3 cm with normal right ventricular systolic function. On repeat TTE 6/2023 LVEF 50-55% LVIDd 4.1 cm with normal RV systolic function.  His other comorbidities include HIV maintained on HAART, chronic kidney disease, hyperlipidemia, hypertension.  He presents today to continue advanced heart failure care; tolerating optimised HF GDMT titration.  He has completed CPET 6/2023 during which he exercised for 16: 02 minutes with RER 1.2, VO2 max 19.3 (71%) and VE/VCO2 36.   TTE 11/2024 demonstrates stability of LV systolic function, with LVEF 50-55%.    He has brought his home blood pressure readings, these have been satisfactory ( scanned to EPIC).     Symptomatically he continues to feel well and there is no h/o chest pain, SOB at rest, SOBOE, PND, orthopnoea, bendopnoea, fatigue, leg swelling, palpitations, syncope, or pre syncope.     Functionally, his exercise capacity is described as unlimited and can climb 3 flights of stairs without exertional symptoms \" I can walk forever \", parked on the 5th floor today and does not use the elevator.     He is adherent with heart failure diet, fluid restriction, and HF medications.      He never been admitted with HF.     Surgical Hx:  -CABG 2003     Social Hx:  - Smoking-briefly smoked in college, smoked [id number] for 1 PPD   - ETOH - \" social drinker\" , 1 drink a month at most   - Illicit drugs - never   - Lives alone      Family Hx:  Specifically, there is no " family history of CAD, heart failure, ICD, PPM, LVAD, OHT, arrhythmias, or sudden cardiac death.     maternal gfather-  with anaesthesia, CVA   paternal aunt- heart surgery ? diagnosis   Medication reconciliation completed, see below.      Investigations:     The electronic medical record has been reviewed by me for salient history. All cardiovascular imaging and testing available in the electronic medical record, and [employer] has been reviewed.      Medication Documentation Review Audit       Reviewed by Marilu Sifuentes RN (Registered Nurse) on 24 at 1016      Medication Order Taking? Sig Documenting Provider Last Dose Status   abacavir-lamiVUDine (Epzicom) 600-300 mg tablet [id number] Yes TAKE 1 TABLET BY MOUTH EVERY DAY Nadia SOTO MD  Active   aspirin 81 mg EC tablet [id number] Yes Take 1 tablet (81 mg) by mouth once daily. Historical Provider, MD  Active   atorvastatin (Lipitor) 80 mg tablet [id number] Yes Take 1 tablet (80 mg) by mouth once daily. Nadia SOTO MD  Active   blood pressure test kit-small kit [id number] Yes 1 kit once daily. Bella Garcia MD PhD  Active   brimonidine (AlphaGAN) 0.2 % ophthalmic solution [id number] Yes Administer 1 drop into both eyes 2 times a day. Historical Provider, MD  Active   carvedilol (Coreg) 12.5 mg tablet [id number] Yes Take 1 tablet (12.5 mg) by mouth 2 times a day. Bella Garcia MD PhD  Active   cholecalciferol (Vitamin D-3) 10 mcg (400 unit) tablet,chewable [id number] Yes Chew 2 tablets (800 Units) once daily. Historical Provider, MD  Active   cyanocobalamin (Vitamin B-12) 100 mcg tablet [id number] Yes Take 1 tablet (100 mcg) by mouth once daily. Historical Provider, MD  Active   dapagliflozin propanediol (Farxiga) 10 mg [id number] Yes Take 1 tablet (10 mg) by mouth once daily. Bella Garcia MD PhD  Active    Patient not taking:    Discontinued 24 0615   glucosam garber dip-chondroit-C-Mn 875-589-41-3 mg capsule [id number] Yes Take  "2 capsules by mouth once daily. Historical Provider, MD  Active    Patient not taking:   Discontinued 12/13/24 0615   multivitamin (One Daily Multivitamin) tablet [id number] Yes Take 1 tablet by mouth once daily. Historical Provider, MD  Active   Nexlizet 180-10 mg tablet [id number] Yes Take 1 tablet by mouth once daily. Bella Garcia MD PhD  Active   sacubitriL-valsartan (Entresto) 49-51 mg tablet [id number] Yes Take 1 tablet by mouth 2 times a day. Bella Garcia MD PhD  Active   spironolactone (Aldactone) 25 mg tablet [id number] Yes Take 0.5 tablets (12.5 mg) by mouth once daily. Historical Provider, MD  Active   Tivicay 50 mg tablet [id number] Yes TAKE 1 TABLET BY MOUTH EVERY DAY Nadia SOTO MD  Active   travoprost (Travatan Z) 0.004 % drops ophthalmic solution [id number] Yes Administer 1 drop into both eyes once daily at bedtime. Historical Provider, MD  Active                      RX Allergies   No Known Allergies       Review of Systems   Constitutional: Negative for chills, fever, weight gain and weight loss.   Cardiovascular:  Negative for chest pain, dyspnea on exertion, leg swelling, palpitations and syncope.   Respiratory:  Negative for cough, shortness of breath and wheezing.    Gastrointestinal:  Negative for constipation, diarrhea, nausea and vomiting.   Genitourinary:  Negative for bladder incontinence, frequency and hesitancy.   Neurological:  Negative for dizziness, light-headedness and weakness.        Visit Vitals  Visit Vitals  BP (!) 156/95 (BP Location: Right arm, Patient Position: Sitting)   Pulse 71   Ht 1.765 m (5' 9.5\")   Wt 61.9 kg (136 lb 6.4 oz)   SpO2 98%   BMI 19.85 kg/m²   Smoking Status Former   BSA 1.74 m²      On examination:     Very pleasant thin elderly man in no apparent CP or painful distress.   Immaculately groomed   Neck: No JVD or HJR  Chest wall: healed sternotomy scar , no wound dehiscence. stable sternum  CVS: HS 1,2. No added sounds  Resp: CTA " bilaterally. Percussion note resonant   Abdomen: Flat, SNT, BS wnl  Extremities: No pedal oedema  Skin: warm and dry  CNS: AO x 4, no gross deficits          Lab Results   Component Value Date     WBC 6.1 05/16/2024     HGB 12.6 (L) 05/16/2024     HCT 40.1 (L) 05/16/2024     MCV 98 05/16/2024      05/16/2024        Chemistry           Lab Results   Component Value Date/Time      05/16/2024 1111     K 5.5 (H) 05/16/2024 1111      05/16/2024 1111     CO2 26 05/16/2024 1111     BUN 27 (H) 05/16/2024 1111     CREATININE 1.78 (H) 05/16/2024 1111          Lab Results   Component Value Date/Time     CALCIUM 9.8 05/16/2024 1111     ALKPHOS 33 12/07/2023 1424     AST 21 12/07/2023 1424     ALT 13 12/07/2023 1424     BILITOT 0.5 12/07/2023 1424         No echocardiogram results found for the past 12 months        IMPRESSION:     74 y.o. retired [employer]  who presents for advanced heart failure care. He has a past medical history significant for CAD status post CABG 2003 with Dr Sanders (unknown anatomy) and documented past PCI to one of his vein grafts in 2004, ischemic cardiomyopathy, HFimprovedEF and on TTE 10/2021 LVEF 45-50% LVIDD 5.3 cm with normal right ventricular systolic function. On repeat TTE 6/2023 LVEF 50-55% LVIDd 4.1 cm with normal RV systolic function.  His other comorbidities include HIV maintained on HAART, chronic kidney disease, hyperlipidemia, hypertension.  He presents today to continue advanced heart failure care; tolerating optimised HF GDMT titration.  He has completed CPET 6/2023 during which he exercised for 16: 02 minutes with RER 1.2, VO2 max 19.3 (71%) and VE/VCO2 36.    TTE 11/2024 demonstrates stability of LV systolic function, with LVEF 50-55%.    NYHA Functional Class: 1  ACC/AHA Stage B heart failure  Volume status: Euvolaemic per hx  Perfusion status: warm to touch  Aetiology: ICM        PLAN:     #HFimprovedEF/ischemic cardiomyopathy      -Labs today  -Heart failure lifestyle modifications discussed and Qs answered.      -Medication optimisation:  BB: Continue carvedilol 12.5 mg twice daily  RAASi: Continue sacubitril/valsartan to 97/103 mg twice daily  AA: Continued decreased dose of spironolactone 12.5 mg alt days  daily, hyperK on Ruel 12.5 mg qd  SGLT2i: Continue empagliflozin 10 mg once daily  CCB: Continue to hold amlodipine 10 mg once daily to make blood pressure room for continuation of ARNi  HCTZ: Discontinued previously to make blood pressure room for continuation of ARNI dose  Hydralazine/Isosorbide dinitrate: Will be commenced pending blood pressure room on in person assessment     COUNSELING:   We discussed the following non-pharmacological measures during this visit:  ·Smoking and alcohol abstinence/cessation, if applicable.  ·Dietary and medication compliance (in particular, salt restriction)  ·Monitoring daily weights and blood pressures  ·Exercise regimen (walking)       This note was transcribed using the Dragon Dictation system. There may be grammatical, punctuation, or verbiage errors that can occur with voice recognition programs.      Bella Garcia MD PhD

## 2025-01-09 ENCOUNTER — OFFICE VISIT (OUTPATIENT)
Dept: IMMUNOLOGY | Facility: CLINIC | Age: 75
End: 2025-01-09
Payer: MEDICARE

## 2025-01-09 VITALS
TEMPERATURE: 97.9 F | DIASTOLIC BLOOD PRESSURE: 102 MMHG | HEART RATE: 81 BPM | SYSTOLIC BLOOD PRESSURE: 161 MMHG | WEIGHT: 136.4 LBS | OXYGEN SATURATION: 98 % | BODY MASS INDEX: 20.2 KG/M2 | HEIGHT: 69 IN | RESPIRATION RATE: 16 BRPM

## 2025-01-09 DIAGNOSIS — E78.5 HYPERLIPIDEMIA, UNSPECIFIED HYPERLIPIDEMIA TYPE: ICD-10-CM

## 2025-01-09 DIAGNOSIS — B20 AIDS (MULTI): Primary | ICD-10-CM

## 2025-01-09 DIAGNOSIS — Z79.899 HIGH RISK MEDICATION USE: ICD-10-CM

## 2025-01-09 DIAGNOSIS — Z12.5 SCREENING FOR PROSTATE CANCER: ICD-10-CM

## 2025-01-09 LAB
BASOPHILS # BLD AUTO: 0.01 X10*3/UL (ref 0–0.1)
BASOPHILS NFR BLD AUTO: 0.3 %
EOSINOPHIL # BLD AUTO: 0.06 X10*3/UL (ref 0–0.4)
EOSINOPHIL NFR BLD AUTO: 1.7 %
ERYTHROCYTE [DISTWIDTH] IN BLOOD BY AUTOMATED COUNT: 14 % (ref 11.5–14.5)
HCT VFR BLD AUTO: 40.7 % (ref 41–52)
HGB BLD-MCNC: 13.4 G/DL (ref 13.5–17.5)
IMM GRANULOCYTES # BLD AUTO: 0.01 X10*3/UL (ref 0–0.5)
IMM GRANULOCYTES NFR BLD AUTO: 0.3 % (ref 0–0.9)
LYMPHOCYTES # BLD AUTO: 1.05 X10*3/UL (ref 0.8–3)
LYMPHOCYTES NFR BLD AUTO: 29.7 %
MCH RBC QN AUTO: 32.8 PG (ref 26–34)
MCHC RBC AUTO-ENTMCNC: 32.9 G/DL (ref 32–36)
MCV RBC AUTO: 100 FL (ref 80–100)
MONOCYTES # BLD AUTO: 0.29 X10*3/UL (ref 0.05–0.8)
MONOCYTES NFR BLD AUTO: 8.2 %
NEUTROPHILS # BLD AUTO: 2.12 X10*3/UL (ref 1.6–5.5)
NEUTROPHILS NFR BLD AUTO: 59.8 %
NRBC BLD-RTO: 0 /100 WBCS (ref 0–0)
PLATELET # BLD AUTO: 216 X10*3/UL (ref 150–450)
RBC # BLD AUTO: 4.08 X10*6/UL (ref 4.5–5.9)
WBC # BLD AUTO: 3.5 X10*3/UL (ref 4.4–11.3)

## 2025-01-09 PROCEDURE — 84075 ASSAY ALKALINE PHOSPHATASE: CPT | Performed by: INTERNAL MEDICINE

## 2025-01-09 PROCEDURE — 36415 COLL VENOUS BLD VENIPUNCTURE: CPT | Performed by: INTERNAL MEDICINE

## 2025-01-09 PROCEDURE — 88185 FLOWCYTOMETRY/TC ADD-ON: CPT | Performed by: INTERNAL MEDICINE

## 2025-01-09 PROCEDURE — G2211 COMPLEX E/M VISIT ADD ON: HCPCS | Performed by: INTERNAL MEDICINE

## 2025-01-09 PROCEDURE — 3008F BODY MASS INDEX DOCD: CPT | Performed by: INTERNAL MEDICINE

## 2025-01-09 PROCEDURE — 87536 HIV-1 QUANT&REVRSE TRNSCRPJ: CPT | Performed by: INTERNAL MEDICINE

## 2025-01-09 PROCEDURE — 84153 ASSAY OF PSA TOTAL: CPT | Performed by: INTERNAL MEDICINE

## 2025-01-09 PROCEDURE — 3080F DIAST BP >= 90 MM HG: CPT | Performed by: INTERNAL MEDICINE

## 2025-01-09 PROCEDURE — 99215 OFFICE O/P EST HI 40 MIN: CPT | Performed by: INTERNAL MEDICINE

## 2025-01-09 PROCEDURE — 3077F SYST BP >= 140 MM HG: CPT | Performed by: INTERNAL MEDICINE

## 2025-01-09 PROCEDURE — 1036F TOBACCO NON-USER: CPT | Performed by: INTERNAL MEDICINE

## 2025-01-09 PROCEDURE — 85025 COMPLETE CBC W/AUTO DIFF WBC: CPT | Performed by: INTERNAL MEDICINE

## 2025-01-09 PROCEDURE — 1159F MED LIST DOCD IN RCRD: CPT | Performed by: INTERNAL MEDICINE

## 2025-01-09 PROCEDURE — 1126F AMNT PAIN NOTED NONE PRSNT: CPT | Performed by: INTERNAL MEDICINE

## 2025-01-09 PROCEDURE — 1160F RVW MEDS BY RX/DR IN RCRD: CPT | Performed by: INTERNAL MEDICINE

## 2025-01-09 ASSESSMENT — PAIN SCALES - GENERAL: PAINLEVEL_OUTOF10: 0-NO PAIN

## 2025-01-09 NOTE — PROGRESS NOTES
HIV Clinic Follow-up Visit:    Ephraim Michel was last seen in [facility] on 7/11/24    Missed antiretroviral doses in last 72 hours? No    Sexually active? no, Partner/s aware of diagnosis? NA,   U=U: aware but NA  Condom use? NA, Partner on PrEP? NA    Tobacco use: No   Dental visit: November 2024, goes every 6 months for cleaning and exam.  No issues with teeth.    SUBJECTIVE:   Missing no meds.  Recently seen in HF clinic.  No changes made.  He is doing well.  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  On ROS, all negative except for .  States that he drinks 16 oz of water at bedtime with his medications and metamucil.  He wakes up 1-2 times at night ( hours in between) to urinate.  Denies hesitancy, urgency, incomplete void but does dribble at end of urination. Feels rested when wakes up.  No bowel issues but takes Metamucil daily for bowels and weight management    Review of Systems  Full 10 point ROS performed.  All pertinent positives and negatives as documented in HPI.     CURRENT MEDICATIONS:    Current Outpatient Medications:     abacavir-lamiVUDine (Epzicom) 600-300 mg tablet, TAKE 1 TABLET BY MOUTH EVERY DAY, Disp: 30 tablet, Rfl: 5    aspirin 81 mg EC tablet, Take 1 tablet (81 mg) by mouth once daily., Disp: , Rfl:     atorvastatin (Lipitor) 80 mg tablet, Take 1 tablet (80 mg) by mouth once daily., Disp: 90 tablet, Rfl: 3    blood pressure test kit-small kit, 1 kit once daily., Disp: 1 each, Rfl: 0    brimonidine (AlphaGAN) 0.2 % ophthalmic solution, Administer 1 drop into both eyes 2 times a day., Disp: , Rfl:     carvedilol (Coreg) 12.5 mg tablet, Take 1 tablet (12.5 mg) by mouth 2 times a day., Disp: 60 tablet, Rfl: 5    cholecalciferol (Vitamin D-3) 10 mcg (400 unit) tablet,chewable, Chew 2 tablets (800 Units) once daily., Disp: , Rfl:     cyanocobalamin (Vitamin B-12) 100 mcg tablet, Take 1 tablet (100 mcg) by mouth once daily., Disp: , Rfl:     dapagliflozin propanediol  "(Farxiga) 10 mg, Take 1 tablet (10 mg) by mouth once daily., Disp: 90 tablet, Rfl: 3    glucosam garber dip-chondroit-C-Mn 541-159-62-3 mg capsule, Take 2 capsules by mouth once daily., Disp: , Rfl:     multivitamin (One Daily Multivitamin) tablet, Take 1 tablet by mouth once daily., Disp: , Rfl:     Nexlizet 180-10 mg tablet, Take 1 tablet by mouth once daily., Disp: 90 tablet, Rfl: 3    sacubitriL-valsartan (Entresto) 49-51 mg tablet, Take 1 tablet by mouth 2 times a day., Disp: 60 tablet, Rfl: 6    spironolactone (Aldactone) 25 mg tablet, Take 0.5 tablets (12.5 mg) by mouth once daily., Disp: , Rfl:     Tivicay 50 mg tablet, TAKE 1 TABLET BY MOUTH EVERY DAY, Disp: 30 tablet, Rfl: 5    travoprost (Travatan Z) 0.004 % drops ophthalmic solution, Administer 1 drop into both eyes once daily at bedtime., Disp: , Rfl:     PHYSICAL EXAMINATION:  Visit Vitals  BP (!) 161/102 (BP Location: Right arm, Patient Position: Sitting, BP Cuff Size: Adult long)   Pulse 81   Temp 36.6 °C (97.9 °F) (Skin)   Resp 16   Ht 1.753 m (5' 9\")   Wt 61.9 kg (136 lb 6.4 oz)   SpO2 98%   BMI 20.14 kg/m²   Smoking Status Former   BSA 1.74 m²       Physical Exam   Physical Exam  Vitals reviewed.   Constitutional:       Appearance: Normal appearance.      Comments: Thin, weight stable   HENT:      Head: Normocephalic and atraumatic.      Mouth/Throat:      Mouth: Mucous membranes are moist.      Pharynx: Oropharynx is clear.   Eyes:      Extraocular Movements: Extraocular movements intact.      Conjunctiva/sclera: Conjunctivae normal.      Pupils: Pupils are equal, round, and reactive to light.   Cardiovascular:      Rate and Rhythm: Normal rate and regular rhythm.      Heart sounds: Normal heart sounds.   Pulmonary:      Effort: Pulmonary effort is normal.      Breath sounds: Normal breath sounds.   Abdominal:      General: Abdomen is flat. Bowel sounds are normal.      Palpations: Abdomen is soft.   Musculoskeletal:      Cervical back: Normal range " of motion and neck supple.   Neurological:      General: No focal deficit present.      Mental Status: He is alert and oriented to person, place, and time.      Cranial Nerves: No cranial nerve deficit.         PERTINENT DATA:  CBC:  WBC   Date Value Ref Range Status   12/13/2024 4.2 (L) 4.4 - 11.3 x10*3/uL Final     nRBC   Date Value Ref Range Status   12/13/2024 0.0 0.0 - 0.0 /100 WBCs Final     RBC   Date Value Ref Range Status   12/13/2024 3.95 (L) 4.50 - 5.90 x10*6/uL Final     Hemoglobin   Date Value Ref Range Status   12/13/2024 12.8 (L) 13.5 - 17.5 g/dL Final     MCV   Date Value Ref Range Status   12/13/2024 100 80 - 100 fL Final     RDW   Date Value Ref Range Status   12/13/2024 13.6 11.5 - 14.5 % Final       Renal Function Panel:  Glucose   Date Value Ref Range Status   12/13/2024 76 74 - 99 mg/dL Final     Sodium   Date Value Ref Range Status   12/13/2024 142 136 - 145 mmol/L Final     Potassium   Date Value Ref Range Status   12/13/2024 4.9 3.5 - 5.3 mmol/L Final     Chloride   Date Value Ref Range Status   12/13/2024 105 98 - 107 mmol/L Final     Anion Gap   Date Value Ref Range Status   12/13/2024 16 10 - 20 mmol/L Final     Urea Nitrogen   Date Value Ref Range Status   12/13/2024 23 6 - 23 mg/dL Final     Creatinine   Date Value Ref Range Status   12/13/2024 1.81 (H) 0.50 - 1.30 mg/dL Final     eGFR   Date Value Ref Range Status   12/13/2024 39 (L) >60 mL/min/1.73m*2 Final     Comment:     Calculations of estimated GFR are performed using the 2021 CKD-EPI Study Refit equation without the race variable for the IDMS-Traceable creatinine methods.  https://jasn.asnjournals.org/content/early/2021/09/22/ASN.5729157703     Calcium   Date Value Ref Range Status   12/13/2024 9.8 8.6 - 10.6 mg/dL Final     Phosphorus   Date Value Ref Range Status   12/13/2024 4.1 2.5 - 4.9 mg/dL Final     Comment:     The performance characteristics of phosphorus testing in heparinized plasma have been validated by the  individual  laboratory site where testing is performed. Testing on heparinized plasma is not approved by the FDA; however, such approval is not necessary.     Albumin   Date Value Ref Range Status   12/13/2024 4.2 3.4 - 5.0 g/dL Final       Hepatic Panel:  Albumin   Date Value Ref Range Status   12/13/2024 4.2 3.4 - 5.0 g/dL Final     Bilirubin, Total   Date Value Ref Range Status   07/11/2024 0.5 0.0 - 1.2 mg/dL Final     Bilirubin, Direct   Date Value Ref Range Status   07/11/2024 0.1 0.0 - 0.3 mg/dL Final     Alkaline Phosphatase   Date Value Ref Range Status   07/11/2024 30 (L) 33 - 136 U/L Final     ALT   Date Value Ref Range Status   07/11/2024 10 10 - 52 U/L Final     Comment:     Patients treated with Sulfasalazine may generate falsely decreased results for ALT.       HIV Viral Load:  HIV-1 RNA PCR Viral Load Log   Date Value Ref Range Status   07/11/2024   Final     Comment:     Not calculated     HIV RNA Result   Date Value Ref Range Status   07/11/2024 Not Detected Not Detected Final       CD4 Count:  CD3+CD4+%   Date Value Ref Range Status   07/11/2024 22 (L) 29 - 57 % Final     CD3+CD4+ Absolute   Date Value Ref Range Status   07/11/2024 0.257 (L) 0.350 - 2.740 x10E9/L Final     CD3+CD8+%   Date Value Ref Range Status   07/11/2024 67 (H) 7 - 31 % Final     CD3+CD8+ Absolute   Date Value Ref Range Status   07/11/2024 0.784 0.080 - 1.490 x10E9/L Final     CD4/CD8 Ratio   Date Value Ref Range Status   07/11/2024 0.33 (L) 1.00 - 2.60 Final     CD45%   Date Value Ref Range Status   06/08/2023 100 % Final       CRCL:  Creatinine, Urine   Date Value Ref Range Status   08/25/2020 66.7 20.0 - 370.0 mg/dL Final       Lipid Panel:  HDL-Cholesterol   Date Value Ref Range Status   10/05/2024 70.5 mg/dL Final     Comment:       Age       Very Low   Low     Normal    High    0-19 Y    < 35      < 40     40-45     ----  20-24 Y    ----     < 40      >45      ----        >24 Y      ----     < 40     40-60      >60    "    Cholesterol/HDL Ratio   Date Value Ref Range Status   10/05/2024 2.2  Final     Comment:       Ref Values  Desirable  < 3.4  High Risk  > 5.0     LDL   Date Value Ref Range Status   09/29/2023 69 0 - 99 mg/dL Final     Comment:     .                           NEAR      BORD      AGE      DESIRABLE  OPTIMAL    HIGH     HIGH     VERY HIGH     0-19 Y     0 - 109     ---    110-129   >/= 130     ----    20-24 Y     0 - 119     ---    120-159   >/= 160     ----      >24 Y     0 -  99   100-129  130-159   160-189     >/=190  .       VLDL   Date Value Ref Range Status   10/05/2024 13 0 - 40 mg/dL Final     Triglycerides   Date Value Ref Range Status   10/05/2024 67 0 - 149 mg/dL Final     Comment:        Age         Desirable   Borderline High   High     Very High   0 D-90 D    19 - 174         ----         ----        ----  91 D- 9 Y     0 -  74        75 -  99     >/= 100      ----    10-19 Y     0 -  89        90 - 129     >/= 130      ----    20-24 Y     0 - 114       115 - 149     >/= 150      ----         >24 Y     0 - 149       150 - 199    200- 499    >/= 500    Venipuncture immediately after or during the administration of Metamizole may lead to falsely low results. Testing should be performed immediately prior to Metamizole dosing.       HgbA1c:  No results found for: \"HGBA1C\"    The ASCVD Risk score (William DK, et al., 2019) failed to calculate for the following reasons:    Unable to determine if patient is Non-     ASSESSMENT / PLAN:    Problem List Items Addressed This Visit       AIDS (Multi) - Primary (Chronic)    Overview     Hx entered by HIV Care provider: DO NOT DELETE    Initially diagnosed on 2/8/2022.  CD4 shakir of 3 on 2/28/02    Pertinent Screens:  HAV ab: NR  2/12/02; Post vaccine check  NR on5/7/07  HBsAg: NR 7/8/21  HBsAB: 3.1 (neg) 7/29/21 after 4 doses vax [id number]  HCV Ab: NR 2/12/02  CMV IgG: Neg  2/12/02  Toxo IgG: Neg 2/12/02  G6PD: NL 2/12/02  HLA B57-01:  Neg " 2/25/10  PPD negative 11/20/17  Syphilis IgG neg 11/19/18  Anal PAP: Neg  3/19/12    HAART Hx:  CBV+EFV: 3/14/02 -3/4/04.  CBV +ANAM/r:  3/4/04- 10/30/06  TVD+ANAM/r: 10/30/06 - 4/9/2012.  Switched TVD to  Epzicom (EPZ) on 4/9/2012. Regimen was EPZ+ANAM/r from 4/9/12 - 4/24/17.  Switched to EPZ + Dolutegravir on 4/24/17 to remove boosting agent of RTV to allow treatment of elevated cholesterol with newer agents.    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  Routine labs will be collected today: CBC w/ diff, Hepatic panel, renal panel, CD4 and HIV RNA PCR.  Vaccines updated  STD screening performed.  Will RTC in 6 months             Relevant Orders    CBC and Auto Differential    CD4/8 Panel    HIV RNA, quantitative, PCR    Hyperlipidemia    Current Assessment & Plan     Lipids at target (or better).  Will continue current medication therapy         High risk medication use    Relevant Orders    Comprehensive Metabolic Panel     Other Visit Diagnoses       Screening for prostate cancer        Relevant Orders    Prostate Specific Antigen, Screen            Nadia Molina MD

## 2025-01-09 NOTE — ASSESSMENT & PLAN NOTE
Immunologically intact and virologically suppressed on current anti-retroviral agents.  Routine labs will be collected today: CBC w/ diff, Hepatic panel, renal panel, CD4 and HIV RNA PCR.  Vaccines updated  STD screening performed.  Will RTC in 6 months      
Lipids at target (or better).  Will continue current medication therapy  
28-Nov-2023 08:38
denies pain/discomfort (Rating = 0)

## 2025-01-09 NOTE — PATIENT INSTRUCTIONS
It was great to see you today!  Your last blood work that we did in clinic showed that your T-cells (CD4 count) was 257 / 22%.  Your virus was fully suppressed at less than 20 copies.  Keep up the great work taking your medications.  We collected routine blood work today.  I will see you back in clinic in 6 months.  If any issues should arise before then, please remember to call the clinic and get in contact with your nurse.    Your lipids were great and at goal.    I have ordered your Prostate test with today's blood work    Happy New Year!!!!

## 2025-01-10 LAB
ALBUMIN SERPL BCP-MCNC: 4.6 G/DL (ref 3.4–5)
ALP SERPL-CCNC: 34 U/L (ref 33–136)
ALT SERPL W P-5'-P-CCNC: 14 U/L (ref 10–52)
ANION GAP SERPL CALC-SCNC: 18 MMOL/L (ref 10–20)
AST SERPL W P-5'-P-CCNC: 22 U/L (ref 9–39)
BILIRUB SERPL-MCNC: 0.6 MG/DL (ref 0–1.2)
BUN SERPL-MCNC: 24 MG/DL (ref 6–23)
CALCIUM SERPL-MCNC: 10.4 MG/DL (ref 8.6–10.6)
CD3+CD4+ CELLS # BLD: 0.23 X10E9/L
CD3+CD4+ CELLS # BLD: 231 /MM3
CD3+CD4+ CELLS NFR BLD: 22 %
CD3+CD4+ CELLS/CD3+CD8+ CLL BLD: 0.33 %
CD3+CD8+ CELLS # BLD: 0.7 X10E9/L
CD3+CD8+ CELLS NFR BLD: 67 %
CHLORIDE SERPL-SCNC: 102 MMOL/L (ref 98–107)
CO2 SERPL-SCNC: 28 MMOL/L (ref 21–32)
CREAT SERPL-MCNC: 1.89 MG/DL (ref 0.5–1.3)
EGFRCR SERPLBLD CKD-EPI 2021: 37 ML/MIN/1.73M*2
GLUCOSE SERPL-MCNC: 68 MG/DL (ref 74–99)
HIV1 RNA # PLAS NAA DL=20: 21 COPIES/ML
HIV1 RNA # PLAS NAA DL=20: DETECTED {COPIES}/ML
HIV1 RNA SPEC NAA+PROBE-LOG#: 1.32 LOG10 CPY/ML
LYMPHOCYTES # SPEC AUTO: 1.05 X10*3/UL
POTASSIUM SERPL-SCNC: 5 MMOL/L (ref 3.5–5.3)
PROT SERPL-MCNC: 7.7 G/DL (ref 6.4–8.2)
PSA SERPL-MCNC: 0.21 NG/ML
SODIUM SERPL-SCNC: 143 MMOL/L (ref 136–145)

## 2025-02-05 DIAGNOSIS — Z21 HIV INFECTION, UNSPECIFIED SYMPTOM STATUS: ICD-10-CM

## 2025-02-05 RX ORDER — DOLUTEGRAVIR SODIUM 50 MG/1
50 TABLET, FILM COATED ORAL DAILY
Qty: 30 TABLET | Refills: 5 | Status: SHIPPED | OUTPATIENT
Start: 2025-02-05

## 2025-03-10 DIAGNOSIS — B20 HIV DISEASE (MULTI): ICD-10-CM

## 2025-03-10 RX ORDER — ABACAVIR AND LAMIVUDINE 600; 300 MG/1; MG/1
1 TABLET, FILM COATED ORAL DAILY
Qty: 30 TABLET | Refills: 5 | Status: SHIPPED | OUTPATIENT
Start: 2025-03-10

## 2025-03-11 DIAGNOSIS — I42.0 ISCHEMIC DILATED CARDIOMYOPATHY (MULTI): ICD-10-CM

## 2025-03-11 DIAGNOSIS — I25.5 ISCHEMIC DILATED CARDIOMYOPATHY (MULTI): ICD-10-CM

## 2025-03-11 RX ORDER — DAPAGLIFLOZIN 10 MG/1
10 TABLET, FILM COATED ORAL DAILY
Qty: 90 TABLET | Refills: 3 | Status: SHIPPED | OUTPATIENT
Start: 2025-03-11 | End: 2025-03-11 | Stop reason: SDUPTHER

## 2025-03-11 RX ORDER — DAPAGLIFLOZIN 10 MG/1
10 TABLET, FILM COATED ORAL DAILY
Qty: 90 TABLET | Refills: 3 | Status: SHIPPED | OUTPATIENT
Start: 2025-03-11 | End: 2025-03-12 | Stop reason: SDUPTHER

## 2025-03-12 DIAGNOSIS — I25.5 ISCHEMIC DILATED CARDIOMYOPATHY (MULTI): ICD-10-CM

## 2025-03-12 DIAGNOSIS — I42.0 ISCHEMIC DILATED CARDIOMYOPATHY (MULTI): ICD-10-CM

## 2025-03-12 RX ORDER — DAPAGLIFLOZIN 10 MG/1
10 TABLET, FILM COATED ORAL DAILY
Qty: 90 TABLET | Refills: 3 | Status: SHIPPED | OUTPATIENT
Start: 2025-03-12

## 2025-03-17 RX ORDER — CARVEDILOL 12.5 MG/1
12.5 TABLET ORAL 2 TIMES DAILY
Qty: 60 TABLET | Refills: 5 | Status: SHIPPED | OUTPATIENT
Start: 2025-03-17

## 2025-03-24 DIAGNOSIS — I25.5 ISCHEMIC DILATED CARDIOMYOPATHY (MULTI): Primary | ICD-10-CM

## 2025-03-24 DIAGNOSIS — I42.0 ISCHEMIC DILATED CARDIOMYOPATHY (MULTI): Primary | ICD-10-CM

## 2025-03-24 RX ORDER — SPIRONOLACTONE 25 MG/1
12.5 TABLET ORAL DAILY
Qty: 45 TABLET | Refills: 1 | Status: SHIPPED | OUTPATIENT
Start: 2025-03-24

## 2025-04-24 DIAGNOSIS — I25.5 ISCHEMIC DILATED CARDIOMYOPATHY (MULTI): ICD-10-CM

## 2025-04-24 DIAGNOSIS — I42.0 ISCHEMIC DILATED CARDIOMYOPATHY (MULTI): ICD-10-CM

## 2025-04-25 RX ORDER — SACUBITRIL AND VALSARTAN 49; 51 MG/1; MG/1
1 TABLET, FILM COATED ORAL 2 TIMES DAILY
Qty: 60 TABLET | Refills: 6 | Status: SHIPPED | OUTPATIENT
Start: 2025-04-25

## 2025-07-02 DIAGNOSIS — I42.0 ISCHEMIC DILATED CARDIOMYOPATHY (MULTI): ICD-10-CM

## 2025-07-02 DIAGNOSIS — I25.5 ISCHEMIC DILATED CARDIOMYOPATHY (MULTI): ICD-10-CM

## 2025-07-02 RX ORDER — CARVEDILOL 12.5 MG/1
12.5 TABLET ORAL 2 TIMES DAILY
Qty: 60 TABLET | Refills: 3 | Status: SHIPPED | OUTPATIENT
Start: 2025-07-02

## 2025-07-03 ENCOUNTER — OFFICE VISIT (OUTPATIENT)
Dept: IMMUNOLOGY | Facility: CLINIC | Age: 75
End: 2025-07-03
Payer: COMMERCIAL

## 2025-07-03 VITALS
OXYGEN SATURATION: 100 % | SYSTOLIC BLOOD PRESSURE: 178 MMHG | HEART RATE: 62 BPM | BODY MASS INDEX: 20.35 KG/M2 | DIASTOLIC BLOOD PRESSURE: 74 MMHG | WEIGHT: 137.8 LBS | TEMPERATURE: 97.7 F

## 2025-07-03 DIAGNOSIS — Z00.00 HEALTHCARE MAINTENANCE: ICD-10-CM

## 2025-07-03 DIAGNOSIS — Z79.899 HIGH RISK MEDICATION USE: ICD-10-CM

## 2025-07-03 DIAGNOSIS — B20 AIDS (MULTI): Primary | ICD-10-CM

## 2025-07-03 LAB
ALBUMIN SERPL BCP-MCNC: 4.3 G/DL (ref 3.4–5)
ALP SERPL-CCNC: 31 U/L (ref 33–136)
ALT SERPL W P-5'-P-CCNC: 12 U/L (ref 10–52)
ANION GAP SERPL CALC-SCNC: 16 MMOL/L (ref 10–20)
AST SERPL W P-5'-P-CCNC: 21 U/L (ref 9–39)
BASOPHILS # BLD AUTO: 0.02 X10*3/UL (ref 0–0.1)
BASOPHILS NFR BLD AUTO: 0.5 %
BILIRUB SERPL-MCNC: 0.6 MG/DL (ref 0–1.2)
BUN SERPL-MCNC: 21 MG/DL (ref 6–23)
CALCIUM SERPL-MCNC: 10.2 MG/DL (ref 8.6–10.6)
CHLORIDE SERPL-SCNC: 103 MMOL/L (ref 98–107)
CHOLEST SERPL-MCNC: 144 MG/DL (ref 0–199)
CHOLESTEROL/HDL RATIO: 2
CO2 SERPL-SCNC: 26 MMOL/L (ref 21–32)
CREAT SERPL-MCNC: 1.88 MG/DL (ref 0.5–1.3)
EGFRCR SERPLBLD CKD-EPI 2021: 37 ML/MIN/1.73M*2
EOSINOPHIL # BLD AUTO: 0.07 X10*3/UL (ref 0–0.4)
EOSINOPHIL NFR BLD AUTO: 1.8 %
ERYTHROCYTE [DISTWIDTH] IN BLOOD BY AUTOMATED COUNT: 14.5 % (ref 11.5–14.5)
GLUCOSE SERPL-MCNC: 72 MG/DL (ref 74–99)
HCT VFR BLD AUTO: 41.1 % (ref 41–52)
HDLC SERPL-MCNC: 70.4 MG/DL
HGB BLD-MCNC: 12.5 G/DL (ref 13.5–17.5)
IMM GRANULOCYTES # BLD AUTO: 0.01 X10*3/UL (ref 0–0.5)
IMM GRANULOCYTES NFR BLD AUTO: 0.3 % (ref 0–0.9)
LDLC SERPL CALC-MCNC: 57 MG/DL
LYMPHOCYTES # BLD AUTO: 1.32 X10*3/UL (ref 0.8–3)
LYMPHOCYTES NFR BLD AUTO: 33.4 %
MCH RBC QN AUTO: 31.8 PG (ref 26–34)
MCHC RBC AUTO-ENTMCNC: 30.4 G/DL (ref 32–36)
MCV RBC AUTO: 105 FL (ref 80–100)
MONOCYTES # BLD AUTO: 0.31 X10*3/UL (ref 0.05–0.8)
MONOCYTES NFR BLD AUTO: 7.8 %
NEUTROPHILS # BLD AUTO: 2.22 X10*3/UL (ref 1.6–5.5)
NEUTROPHILS NFR BLD AUTO: 56.2 %
NON HDL CHOLESTEROL: 74 MG/DL (ref 0–149)
NRBC BLD-RTO: 0 /100 WBCS (ref 0–0)
PHOSPHATE SERPL-MCNC: 4.1 MG/DL (ref 2.5–4.9)
PLATELET # BLD AUTO: 205 X10*3/UL (ref 150–450)
POTASSIUM SERPL-SCNC: 4.9 MMOL/L (ref 3.5–5.3)
PROT SERPL-MCNC: 6.9 G/DL (ref 6.4–8.2)
RBC # BLD AUTO: 3.93 X10*6/UL (ref 4.5–5.9)
SODIUM SERPL-SCNC: 140 MMOL/L (ref 136–145)
TRIGL SERPL-MCNC: 81 MG/DL (ref 0–149)
VLDL: 16 MG/DL (ref 0–40)
WBC # BLD AUTO: 4 X10*3/UL (ref 4.4–11.3)

## 2025-07-03 PROCEDURE — 80061 LIPID PANEL: CPT | Performed by: INTERNAL MEDICINE

## 2025-07-03 PROCEDURE — 84100 ASSAY OF PHOSPHORUS: CPT | Performed by: INTERNAL MEDICINE

## 2025-07-03 PROCEDURE — 36415 COLL VENOUS BLD VENIPUNCTURE: CPT | Performed by: INTERNAL MEDICINE

## 2025-07-03 PROCEDURE — 87536 HIV-1 QUANT&REVRSE TRNSCRPJ: CPT | Performed by: INTERNAL MEDICINE

## 2025-07-03 PROCEDURE — 85025 COMPLETE CBC W/AUTO DIFF WBC: CPT | Performed by: INTERNAL MEDICINE

## 2025-07-03 PROCEDURE — 80053 COMPREHEN METABOLIC PANEL: CPT | Performed by: INTERNAL MEDICINE

## 2025-07-03 PROCEDURE — 99214 OFFICE O/P EST MOD 30 MIN: CPT | Performed by: INTERNAL MEDICINE

## 2025-07-03 ASSESSMENT — PATIENT HEALTH QUESTIONNAIRE - PHQ9
2. FEELING DOWN, DEPRESSED OR HOPELESS: NOT AT ALL
SUM OF ALL RESPONSES TO PHQ9 QUESTIONS 1 AND 2: 0
1. LITTLE INTEREST OR PLEASURE IN DOING THINGS: NOT AT ALL

## 2025-07-03 ASSESSMENT — ANXIETY QUESTIONNAIRES
2. NOT BEING ABLE TO STOP OR CONTROL WORRYING: NOT AT ALL
7. FEELING AFRAID AS IF SOMETHING AWFUL MIGHT HAPPEN: NOT AT ALL
GAD7 TOTAL SCORE: 0
3. WORRYING TOO MUCH ABOUT DIFFERENT THINGS: NOT AT ALL
4. TROUBLE RELAXING: NOT AT ALL
1. FEELING NERVOUS, ANXIOUS, OR ON EDGE: NOT AT ALL
6. BECOMING EASILY ANNOYED OR IRRITABLE: NOT AT ALL
5. BEING SO RESTLESS THAT IT IS HARD TO SIT STILL: NOT AT ALL
IF YOU CHECKED OFF ANY PROBLEMS ON THIS QUESTIONNAIRE, HOW DIFFICULT HAVE THESE PROBLEMS MADE IT FOR YOU TO DO YOUR WORK, TAKE CARE OF THINGS AT HOME, OR GET ALONG WITH OTHER PEOPLE: NOT DIFFICULT AT ALL

## 2025-07-03 ASSESSMENT — COLUMBIA-SUICIDE SEVERITY RATING SCALE - C-SSRS
2. HAVE YOU ACTUALLY HAD ANY THOUGHTS OF KILLING YOURSELF?: NO
6. HAVE YOU EVER DONE ANYTHING, STARTED TO DO ANYTHING, OR PREPARED TO DO ANYTHING TO END YOUR LIFE?: NO
1. IN THE PAST MONTH, HAVE YOU WISHED YOU WERE DEAD OR WISHED YOU COULD GO TO SLEEP AND NOT WAKE UP?: NO

## 2025-07-03 ASSESSMENT — ENCOUNTER SYMPTOMS
DEPRESSION: 0
OCCASIONAL FEELINGS OF UNSTEADINESS: 0
LOSS OF SENSATION IN FEET: 0

## 2025-07-03 NOTE — PROGRESS NOTES
HIV Clinic Follow-up Visit:    Ephraim Michel was last seen in [facility] on 1/9/2025    Missed antiretroviral doses in last 72 hours? No    Sexually active? no, Partner/s aware of diagnosis? NA,   U=U: Aware but NA  Condom use? NA, Partner on PrEP? NA  Tobacco use: No   Dentist: 6 months ago, has appt in 2 weeks    SUBJECTIVE:   He has been doing well.  No complaints.  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    Specifically denies SOB, cough, PND, orthopnea, or leg swelling.  Sees Dr. Garcia in August for chronic, asymptomatic heart failure.    Review of Systems  Full 10 point ROS performed.  All pertinent positives and negatives as documented in HPI.      CURRENT MEDICATIONS:  Current Medications[1]    PHYSICAL EXAMINATION:  Visit Vitals  /74 (BP Location: Left arm, Patient Position: Sitting, BP Cuff Size: Adult)   Pulse 62   Temp 36.5 °C (97.7 °F)   Wt 62.5 kg (137 lb 12.8 oz)   SpO2 100%   BMI 20.35 kg/m²   Smoking Status Former   BSA 1.74 m²       Physical Exam   Physical Exam  Vitals reviewed.   Constitutional:       General: He is not in acute distress.     Appearance: Normal appearance. He is not ill-appearing.   HENT:      Head: Normocephalic and atraumatic.      Mouth/Throat:      Mouth: Mucous membranes are moist.      Pharynx: Oropharynx is clear. No posterior oropharyngeal erythema.      Comments: Dentition in good repair  Eyes:      Extraocular Movements: Extraocular movements intact.      Conjunctiva/sclera: Conjunctivae normal.      Pupils: Pupils are equal, round, and reactive to light.   Cardiovascular:      Rate and Rhythm: Normal rate and regular rhythm.      Heart sounds: Normal heart sounds.   Pulmonary:      Effort: Pulmonary effort is normal.      Breath sounds: Normal breath sounds.   Abdominal:      General: Abdomen is flat. Bowel sounds are normal.      Palpations: Abdomen is soft.   Musculoskeletal:      Cervical back: Normal range of motion and neck  supple.   Neurological:      General: No focal deficit present.      Mental Status: He is alert and oriented to person, place, and time.      Cranial Nerves: No cranial nerve deficit.         PERTINENT DATA:  CBC:  WBC   Date Value Ref Range Status   01/09/2025 3.5 (L) 4.4 - 11.3 x10*3/uL Final     nRBC   Date Value Ref Range Status   01/09/2025 0.0 0.0 - 0.0 /100 WBCs Final     RBC   Date Value Ref Range Status   01/09/2025 4.08 (L) 4.50 - 5.90 x10*6/uL Final     Hemoglobin   Date Value Ref Range Status   01/09/2025 13.4 (L) 13.5 - 17.5 g/dL Final     MCV   Date Value Ref Range Status   01/09/2025 100 80 - 100 fL Final     RDW   Date Value Ref Range Status   01/09/2025 14.0 11.5 - 14.5 % Final       Renal Function Panel:  Glucose   Date Value Ref Range Status   01/09/2025 68 (L) 74 - 99 mg/dL Final     Sodium   Date Value Ref Range Status   01/09/2025 143 136 - 145 mmol/L Final     Potassium   Date Value Ref Range Status   01/09/2025 5.0 3.5 - 5.3 mmol/L Final     Chloride   Date Value Ref Range Status   01/09/2025 102 98 - 107 mmol/L Final     Anion Gap   Date Value Ref Range Status   01/09/2025 18 10 - 20 mmol/L Final     Urea Nitrogen   Date Value Ref Range Status   01/09/2025 24 (H) 6 - 23 mg/dL Final     Creatinine   Date Value Ref Range Status   01/09/2025 1.89 (H) 0.50 - 1.30 mg/dL Final     eGFR   Date Value Ref Range Status   01/09/2025 37 (L) >60 mL/min/1.73m*2 Final     Comment:     Calculations of estimated GFR are performed using the 2021 CKD-EPI Study Refit equation without the race variable for the IDMS-Traceable creatinine methods.  https://jasn.asnjournals.org/content/early/2021/09/22/ASN.9559017623     Calcium   Date Value Ref Range Status   01/09/2025 10.4 8.6 - 10.6 mg/dL Final     Phosphorus   Date Value Ref Range Status   12/13/2024 4.1 2.5 - 4.9 mg/dL Final     Comment:     The performance characteristics of phosphorus testing in heparinized plasma have been validated by the individual [facility]  laboratory site where testing is performed. Testing on heparinized plasma is not approved by the FDA; however, such approval is not necessary.     Albumin   Date Value Ref Range Status   01/09/2025 4.6 3.4 - 5.0 g/dL Final       Hepatic Panel:  Albumin   Date Value Ref Range Status   01/09/2025 4.6 3.4 - 5.0 g/dL Final     Bilirubin, Total   Date Value Ref Range Status   01/09/2025 0.6 0.0 - 1.2 mg/dL Final     Bilirubin, Direct   Date Value Ref Range Status   07/11/2024 0.1 0.0 - 0.3 mg/dL Final     Alkaline Phosphatase   Date Value Ref Range Status   01/09/2025 34 33 - 136 U/L Final     ALT   Date Value Ref Range Status   01/09/2025 14 10 - 52 U/L Final     Comment:     Patients treated with Sulfasalazine may generate falsely decreased results for ALT.       HIV Viral Load:  HIV-1 RNA PCR Viral Load Log   Date Value Ref Range Status   01/09/2025 1.32 log10 cpy/mL Final     HIV RNA Result   Date Value Ref Range Status   01/09/2025 Detected (A), 21 Not Detected Final       CD4 Count:  CD3+CD4+%   Date Value Ref Range Status   01/09/2025 22 (L) 29 - 57 % Final     CD3+CD4+ Absolute   Date Value Ref Range Status   01/09/2025 0.231 (L) 0.350 - 2.740 x10E9/L Final     CD3+CD8+%   Date Value Ref Range Status   01/09/2025 67 (H) 7 - 31 % Final     CD3+CD8+ Absolute   Date Value Ref Range Status   01/09/2025 0.704 0.080 - 1.490 x10E9/L Final     CD4/CD8 Ratio   Date Value Ref Range Status   01/09/2025 0.33 (L) 1.00 - 2.60 Final     CD45%   Date Value Ref Range Status   06/08/2023 100 % Final       CRCL:  Creatinine, Urine   Date Value Ref Range Status   08/25/2020 66.7 20.0 - 370.0 mg/dL Final       Lipid Panel:  HDL-Cholesterol   Date Value Ref Range Status   10/05/2024 70.5 mg/dL Final     Comment:       Age       Very Low   Low     Normal    High    0-19 Y    < 35      < 40     40-45     ----  20-24 Y    ----     < 40      >45      ----        >24 Y      ----     < 40     40-60      >60       Cholesterol/HDL Ratio    Date Value Ref Range Status   10/05/2024 2.2  Final     Comment:       Ref Values  Desirable  < 3.4  High Risk  > 5.0     LDL   Date Value Ref Range Status   09/29/2023 69 0 - 99 mg/dL Final     Comment:     .                           NEAR      BORD      AGE      DESIRABLE  OPTIMAL    HIGH     HIGH     VERY HIGH     0-19 Y     0 - 109     ---    110-129   >/= 130     ----    20-24 Y     0 - 119     ---    120-159   >/= 160     ----      >24 Y     0 -  99   100-129  130-159   160-189     >/=190  .       VLDL   Date Value Ref Range Status   10/05/2024 13 0 - 40 mg/dL Final     Triglycerides   Date Value Ref Range Status   10/05/2024 67 0 - 149 mg/dL Final     Comment:        Age         Desirable   Borderline High   High     Very High   0 D-90 D    19 - 174         ----         ----        ----  91 D- 9 Y     0 -  74        75 -  99     >/= 100      ----    10-19 Y     0 -  89        90 - 129     >/= 130      ----    20-24 Y     0 - 114       115 - 149     >/= 150      ----         >24 Y     0 - 149       150 - 199    200- 499    >/= 500    Venipuncture immediately after or during the administration of Metamizole may lead to falsely low results. Testing should be performed immediately prior to Metamizole dosing.       The ASCVD Risk score (William DK, et al., 2019) failed to calculate for the following reasons:    Unable to determine if patient is Non-     ASSESSMENT / PLAN:    Problem List Items Addressed This Visit       AIDS (Multi) - Primary (Chronic)    Overview   Hx entered by HIV Care provider: DO NOT DELETE    Initially diagnosed on 2/8/2022.  CD4 shakir of 3 on 2/28/02    Pertinent Screens:  HAV ab: NR  2/12/02; Post vaccine check  NR on5/7/07  HBsAg: NR 7/8/21  HBsAB: 3.1 (neg) 7/29/21 after 4 doses vax [id number]  HCV Ab: NR 2/12/02  CMV IgG: Neg  2/12/02  Toxo IgG: Neg 2/12/02  G6PD: NL 2/12/02  HLA B57-01:  Neg 2/25/10  PPD negative 11/20/17  Syphilis IgG neg 11/19/18  Anal PAP:  Neg  3/19/12    HAART Hx:  CBV+EFV: 3/14/02 -3/4/04.  CBV +ANAM/r:  3/4/04- 10/30/06  TVD+ANAM/r: 10/30/06 - 4/9/2012.  Switched TVD to  Epzicom (EPZ) on 4/9/2012. Regimen was EPZ+ANAM/r from 4/9/12 - 4/24/17.  Switched to EPZ + Dolutegravir on 4/24/17 to remove boosting agent of RTV to allow treatment of elevated cholesterol with newer agents.    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  - He will continue Epzicom + Dolutegravir  - Routine labs will be collected today: CBC w/ diff, Comprehensive metabolic panel, phosphorus, CD4 and HIV RNA PCR.  - Vaccines up- to -date  - STD screening not performed as no indication.  - I will see him back in 6 months           Relevant Orders    CBC and Auto Differential    CD4/8 Panel    HIV RNA, quantitative, PCR    High risk medication use    Relevant Orders    Lipid Panel    Phosphorus    Comprehensive Metabolic Panel    Healthcare maintenance    Current Assessment & Plan   Colonoscopy 5/5/22: Internal hemorrhoids only.  No further polyps (had Tub adenoma in 2018)  PSA screen negative in February 2025.              Nadia Molina MD           [1]   Current Outpatient Medications:     abacavir-lamiVUDine (Epzicom) 600-300 mg tablet, Take 1 tablet by mouth once daily., Disp: 30 tablet, Rfl: 5    aspirin 81 mg EC tablet, Take 1 tablet (81 mg) by mouth once daily., Disp: , Rfl:     atorvastatin (Lipitor) 80 mg tablet, Take 1 tablet (80 mg) by mouth once daily., Disp: 90 tablet, Rfl: 3    brimonidine (AlphaGAN) 0.2 % ophthalmic solution, Administer 1 drop into both eyes 2 times a day., Disp: , Rfl:     carvedilol (Coreg) 12.5 mg tablet, Take 1 tablet (12.5 mg) by mouth 2 times a day., Disp: 60 tablet, Rfl: 3    cholecalciferol (Vitamin D-3) 10 mcg (400 unit) tablet,chewable, Chew and swallow 2 tablets (20 mcg) once daily., Disp:  , Rfl:     cyanocobalamin (Vitamin B-12) 100 mcg tablet, Take 1 tablet (100 mcg) by mouth once daily., Disp: , Rfl:     dapagliflozin propanediol (Farxiga) 10 mg, Take 1 tablet (10 mg) by mouth once daily., Disp: 90 tablet, Rfl: 3    Entresto 49-51 mg tablet, TAKE 1 TABLET BY MOUTH TWICE A DAY, Disp: 60 tablet, Rfl: 6    glucosam garber dip-chondroit-C-Mn 712-304-08-3 mg capsule, Take 2 capsules by mouth once daily., Disp: , Rfl:     multivitamin (One Daily Multivitamin) tablet, Take 1 tablet by mouth once daily., Disp: , Rfl:     Nexlizet 180-10 mg tablet, Take 1 tablet by mouth once daily., Disp: 90 tablet, Rfl: 3    spironolactone (Aldactone) 25 mg tablet, Take 0.5 tablets (12.5 mg) by mouth once daily., Disp: 45 tablet, Rfl: 1    Tivicay 50 mg tablet, TAKE 1 TABLET BY MOUTH EVERY DAY, Disp: 30 tablet, Rfl: 5    travoprost (Travatan Z) 0.004 % drops ophthalmic solution, Administer 1 drop into both eyes once daily at bedtime., Disp: , Rfl:     blood pressure test kit-small kit, 1 kit once daily., Disp: 1 each, Rfl: 0

## 2025-07-03 NOTE — ASSESSMENT & PLAN NOTE
Colonoscopy 5/5/22: Internal hemorrhoids only.  No further polyps (had Tub adenoma in 2018)  PSA screen negative in February 2025.

## 2025-07-03 NOTE — ASSESSMENT & PLAN NOTE
- He remains immunologically intact and virologically suppressed on current anti-retroviral agents.  - He will continue Epzicom + Dolutegravir  - Routine labs will be collected today: CBC w/ diff, Comprehensive metabolic panel, phosphorus, CD4 and HIV RNA PCR.  - Vaccines up- to -date  - STD screening not performed as no indication.  - I will see him back in 6 months

## 2025-07-03 NOTE — PATIENT INSTRUCTIONS
It was great to see you today!  Your last blood work that we did in clinic showed that your T-cells (CD4 count) was 231 / 22%.  Your virus was fully suppressed at less than 20 copies.  Keep up the great work taking your medications.  We collected routine blood work today.  I will see you back in clinic in XXX months.  If any issues should arise before then, please remember to call the clinic and get in contact with your nurse.

## 2025-07-07 LAB
CD3+CD4+ CELLS # BLD: 0.26 X10E9/L (ref 0.35–2.74)
CD3+CD4+ CELLS # BLD: 264 /MM3 (ref 350–2740)
CD3+CD4+ CELLS NFR BLD: 20 % (ref 29–57)
CD3+CD4+ CELLS/CD3+CD8+ CLL BLD: 0.29 % (ref 1–3.5)
CD3+CD8+ CELLS # BLD: 0.91 X10E9/L (ref 0.08–1.49)
CD3+CD8+ CELLS NFR BLD: 69 % (ref 7–31)
LYMPHOCYTES # SPEC AUTO: 1.32 X10*3/UL

## 2025-07-10 ENCOUNTER — APPOINTMENT (OUTPATIENT)
Dept: IMMUNOLOGY | Facility: CLINIC | Age: 75
End: 2025-07-10
Payer: COMMERCIAL

## 2025-08-04 DIAGNOSIS — Z21 HIV INFECTION, UNSPECIFIED SYMPTOM STATUS: ICD-10-CM

## 2025-08-04 RX ORDER — DOLUTEGRAVIR SODIUM 50 MG/1
50 TABLET, FILM COATED ORAL DAILY
Qty: 30 TABLET | Refills: 5 | Status: SHIPPED | OUTPATIENT
Start: 2025-08-04

## 2025-08-14 DIAGNOSIS — E78.5 HYPERLIPIDEMIA, UNSPECIFIED HYPERLIPIDEMIA TYPE: ICD-10-CM

## 2025-08-14 RX ORDER — ATORVASTATIN CALCIUM 80 MG/1
80 TABLET, FILM COATED ORAL DAILY
Qty: 90 TABLET | Refills: 3 | Status: SHIPPED | OUTPATIENT
Start: 2025-08-14

## 2025-08-21 ENCOUNTER — APPOINTMENT (OUTPATIENT)
Dept: CARDIOLOGY | Facility: HOSPITAL | Age: 75
End: 2025-08-21
Payer: MEDICARE

## 2025-08-21 VITALS
WEIGHT: 137 LBS | DIASTOLIC BLOOD PRESSURE: 101 MMHG | SYSTOLIC BLOOD PRESSURE: 164 MMHG | BODY MASS INDEX: 19.61 KG/M2 | OXYGEN SATURATION: 100 % | HEART RATE: 68 BPM | HEIGHT: 70 IN

## 2025-08-21 DIAGNOSIS — I25.5 ISCHEMIC DILATED CARDIOMYOPATHY (MULTI): Primary | ICD-10-CM

## 2025-08-21 DIAGNOSIS — I42.0 ISCHEMIC DILATED CARDIOMYOPATHY (MULTI): Primary | ICD-10-CM

## 2025-08-21 PROCEDURE — 93005 ELECTROCARDIOGRAM TRACING: CPT | Performed by: STUDENT IN AN ORGANIZED HEALTH CARE EDUCATION/TRAINING PROGRAM

## 2025-08-21 PROCEDURE — 99215 OFFICE O/P EST HI 40 MIN: CPT | Performed by: STUDENT IN AN ORGANIZED HEALTH CARE EDUCATION/TRAINING PROGRAM

## 2025-08-21 PROCEDURE — 3077F SYST BP >= 140 MM HG: CPT | Performed by: STUDENT IN AN ORGANIZED HEALTH CARE EDUCATION/TRAINING PROGRAM

## 2025-08-21 PROCEDURE — 99212 OFFICE O/P EST SF 10 MIN: CPT

## 2025-08-21 PROCEDURE — 1126F AMNT PAIN NOTED NONE PRSNT: CPT | Performed by: STUDENT IN AN ORGANIZED HEALTH CARE EDUCATION/TRAINING PROGRAM

## 2025-08-21 PROCEDURE — 3080F DIAST BP >= 90 MM HG: CPT | Performed by: STUDENT IN AN ORGANIZED HEALTH CARE EDUCATION/TRAINING PROGRAM

## 2025-08-21 PROCEDURE — 1159F MED LIST DOCD IN RCRD: CPT | Performed by: STUDENT IN AN ORGANIZED HEALTH CARE EDUCATION/TRAINING PROGRAM

## 2025-08-21 PROCEDURE — 3008F BODY MASS INDEX DOCD: CPT | Performed by: STUDENT IN AN ORGANIZED HEALTH CARE EDUCATION/TRAINING PROGRAM

## 2025-08-21 ASSESSMENT — ENCOUNTER SYMPTOMS
SHORTNESS OF BREATH: 0
OCCASIONAL FEELINGS OF UNSTEADINESS: 0
LOSS OF SENSATION IN FEET: 0
PALPITATIONS: 0
HEADACHES: 0
ORTHOPNEA: 0
DIZZINESS: 0
FALLS: 0
VOMITING: 0
CONSTIPATION: 0
DEPRESSION: 0
DIARRHEA: 0
DYSPNEA ON EXERTION: 0
FREQUENCY: 0
PND: 0
NAUSEA: 0

## 2025-08-21 ASSESSMENT — PATIENT HEALTH QUESTIONNAIRE - PHQ9
1. LITTLE INTEREST OR PLEASURE IN DOING THINGS: NOT AT ALL
SUM OF ALL RESPONSES TO PHQ9 QUESTIONS 1 AND 2: 0
2. FEELING DOWN, DEPRESSED OR HOPELESS: NOT AT ALL

## 2025-08-21 ASSESSMENT — PAIN SCALES - GENERAL: PAINLEVEL_OUTOF10: 0-NO PAIN

## 2025-08-25 LAB
ATRIAL RATE: 59 BPM
P AXIS: 73 DEGREES
P OFFSET: 196 MS
P ONSET: 139 MS
PR INTERVAL: 154 MS
Q ONSET: 216 MS
QRS COUNT: 10 BEATS
QRS DURATION: 100 MS
QT INTERVAL: 434 MS
QTC CALCULATION(BAZETT): 429 MS
QTC FREDERICIA: 431 MS
R AXIS: 55 DEGREES
T AXIS: 18 DEGREES
T OFFSET: 433 MS
VENTRICULAR RATE: 59 BPM

## 2025-08-31 DIAGNOSIS — B20 HIV DISEASE (MULTI): ICD-10-CM

## 2025-09-02 RX ORDER — ABACAVIR AND LAMIVUDINE 600; 300 MG/1; MG/1
1 TABLET, FILM COATED ORAL DAILY
Qty: 30 TABLET | Refills: 5 | Status: SHIPPED | OUTPATIENT
Start: 2025-09-02
